# Patient Record
Sex: MALE | Race: WHITE | NOT HISPANIC OR LATINO | Employment: OTHER | URBAN - METROPOLITAN AREA
[De-identification: names, ages, dates, MRNs, and addresses within clinical notes are randomized per-mention and may not be internally consistent; named-entity substitution may affect disease eponyms.]

---

## 2017-01-27 DIAGNOSIS — E11.65 UNCONTROLLED TYPE 2 DIABETES MELLITUS WITH COMPLICATION, UNSPECIFIED LONG TERM INSULIN USE STATUS: ICD-10-CM

## 2017-01-27 DIAGNOSIS — E11.8 UNCONTROLLED TYPE 2 DIABETES MELLITUS WITH COMPLICATION, UNSPECIFIED LONG TERM INSULIN USE STATUS: ICD-10-CM

## 2017-05-23 NOTE — TELEPHONE ENCOUNTER
Was the patient seen in the last year in this department? No     Does patient have an active prescription for medications requested? No     Received Request Via: Pharmacy

## 2017-05-23 NOTE — TELEPHONE ENCOUNTER
Please contact pt and find out if he has completed labs to recheck diabetes, no results available. Overdue for check and needs appt

## 2017-05-23 NOTE — TELEPHONE ENCOUNTER
Was the patient seen in the last year in this department? No    Does patient have an active prescription for medications requested? No     Received Request Via: Pharmacy     Last seen: 03/10/2016 Bernabe

## 2017-06-02 ENCOUNTER — HOSPITAL ENCOUNTER (OUTPATIENT)
Dept: LAB | Facility: MEDICAL CENTER | Age: 52
End: 2017-06-02
Attending: NURSE PRACTITIONER
Payer: COMMERCIAL

## 2017-06-02 DIAGNOSIS — E11.65 UNCONTROLLED TYPE 2 DIABETES MELLITUS WITH COMPLICATION, UNSPECIFIED LONG TERM INSULIN USE STATUS: ICD-10-CM

## 2017-06-02 DIAGNOSIS — E11.8 UNCONTROLLED TYPE 2 DIABETES MELLITUS WITH COMPLICATION, UNSPECIFIED LONG TERM INSULIN USE STATUS: ICD-10-CM

## 2017-06-02 LAB
ALBUMIN SERPL BCP-MCNC: 4.1 G/DL (ref 3.2–4.9)
ALBUMIN/GLOB SERPL: 1.5 G/DL
ALP SERPL-CCNC: 82 U/L (ref 30–99)
ALT SERPL-CCNC: 30 U/L (ref 2–50)
ANION GAP SERPL CALC-SCNC: 8 MMOL/L (ref 0–11.9)
AST SERPL-CCNC: 19 U/L (ref 12–45)
BILIRUB SERPL-MCNC: 1.3 MG/DL (ref 0.1–1.5)
BUN SERPL-MCNC: 19 MG/DL (ref 8–22)
CALCIUM SERPL-MCNC: 9.7 MG/DL (ref 8.5–10.5)
CHLORIDE SERPL-SCNC: 106 MMOL/L (ref 96–112)
CHOLEST SERPL-MCNC: 93 MG/DL (ref 100–199)
CO2 SERPL-SCNC: 23 MMOL/L (ref 20–33)
CREAT SERPL-MCNC: 1.25 MG/DL (ref 0.5–1.4)
EST. AVERAGE GLUCOSE BLD GHB EST-MCNC: 174 MG/DL
GFR SERPL CREATININE-BSD FRML MDRD: >60 ML/MIN/1.73 M 2
GLOBULIN SER CALC-MCNC: 2.8 G/DL (ref 1.9–3.5)
GLUCOSE SERPL-MCNC: 158 MG/DL (ref 65–99)
HBA1C MFR BLD: 7.7 % (ref 0–5.6)
HDLC SERPL-MCNC: 32 MG/DL
LDLC SERPL CALC-MCNC: 32 MG/DL
POTASSIUM SERPL-SCNC: 4 MMOL/L (ref 3.6–5.5)
PROT SERPL-MCNC: 6.9 G/DL (ref 6–8.2)
SODIUM SERPL-SCNC: 137 MMOL/L (ref 135–145)
TRIGL SERPL-MCNC: 144 MG/DL (ref 0–149)

## 2017-06-02 PROCEDURE — 80061 LIPID PANEL: CPT

## 2017-06-02 PROCEDURE — 80053 COMPREHEN METABOLIC PANEL: CPT

## 2017-06-02 PROCEDURE — 36415 COLL VENOUS BLD VENIPUNCTURE: CPT

## 2017-06-02 PROCEDURE — 83036 HEMOGLOBIN GLYCOSYLATED A1C: CPT

## 2017-06-02 NOTE — TELEPHONE ENCOUNTER
1. Caller Name: Hansel Nba Hendrickson                      Call Back Number: [phone] (home) [phone] (work)  2. Message: left a message to schedule an appointment  And if he ever got his labs done     3. Patient approves office to leave a detailed voicemail/MyChart message: yes

## 2017-06-07 ENCOUNTER — OFFICE VISIT (OUTPATIENT)
Dept: MEDICAL GROUP | Facility: MEDICAL CENTER | Age: 52
End: 2017-06-07
Payer: COMMERCIAL

## 2017-06-07 VITALS
HEART RATE: 82 BPM | WEIGHT: 315 LBS | OXYGEN SATURATION: 96 % | TEMPERATURE: 97.6 F | DIASTOLIC BLOOD PRESSURE: 70 MMHG | HEIGHT: 78 IN | BODY MASS INDEX: 36.45 KG/M2 | SYSTOLIC BLOOD PRESSURE: 124 MMHG

## 2017-06-07 DIAGNOSIS — Z12.5 SCREENING FOR PROSTATE CANCER: ICD-10-CM

## 2017-06-07 DIAGNOSIS — E11.65 UNCONTROLLED TYPE 2 DIABETES MELLITUS WITH COMPLICATION, UNSPECIFIED LONG TERM INSULIN USE STATUS: ICD-10-CM

## 2017-06-07 DIAGNOSIS — E11.8 UNCONTROLLED TYPE 2 DIABETES MELLITUS WITH COMPLICATION, UNSPECIFIED LONG TERM INSULIN USE STATUS: ICD-10-CM

## 2017-06-07 DIAGNOSIS — I10 ESSENTIAL HYPERTENSION: ICD-10-CM

## 2017-06-07 PROCEDURE — 99214 OFFICE O/P EST MOD 30 MIN: CPT | Performed by: NURSE PRACTITIONER

## 2017-06-07 RX ORDER — ATORVASTATIN CALCIUM 40 MG/1
40 TABLET, FILM COATED ORAL DAILY
Qty: 30 TAB | Refills: 5 | OUTPATIENT
Start: 2017-06-07

## 2017-06-07 RX ORDER — GLIPIZIDE 10 MG/1
10 TABLET ORAL 2 TIMES DAILY
Qty: 60 TAB | Refills: 5 | Status: SHIPPED | OUTPATIENT
Start: 2017-06-07 | End: 2017-11-04 | Stop reason: SDUPTHER

## 2017-06-07 RX ORDER — LISINOPRIL 10 MG/1
10 TABLET ORAL
Qty: 30 TAB | Refills: 5 | Status: SHIPPED | OUTPATIENT
Start: 2017-06-07 | End: 2017-11-24 | Stop reason: SDUPTHER

## 2017-06-07 RX ORDER — ATORVASTATIN CALCIUM 40 MG/1
40 TABLET, FILM COATED ORAL DAILY
Qty: 30 TAB | Refills: 5 | Status: SHIPPED | OUTPATIENT
Start: 2017-06-07 | End: 2017-11-23 | Stop reason: SDUPTHER

## 2017-06-07 ASSESSMENT — PATIENT HEALTH QUESTIONNAIRE - PHQ9: CLINICAL INTERPRETATION OF PHQ2 SCORE: 0

## 2017-06-07 NOTE — MR AVS SNAPSHOT
"        Hansel Burgos   2017 1:40 PM   Office Visit   MRN: [id number]    Department:  [facility]   Dept Phone:  [phone]    Description:  Male : 1965   Provider:  VIRGINIA Ortiz           Reason for Visit     Follow-Up           Allergies as of 2017     Allergen Noted Reactions    Pcn [Penicillins] 2015       Had as a child      You were diagnosed with     Uncontrolled type 2 diabetes mellitus with complication, unspecified long term insulin use status (CMS-HCC)   [1746705]       Essential hypertension   [1355438]       Screening for prostate cancer   [762854]         Vital Signs     Blood Pressure Pulse Temperature Height Weight Body Mass Index    124/70 mmHg 82 36.4 °C (97.6 °F) 2.057 m (6' 9\") 153.316 kg (338 lb) 36.23 kg/m2    Oxygen Saturation Smoking Status                96% Never Smoker           Basic Information     Date Of Birth Sex Race Ethnicity Preferred Language    1965 Male White Non- English      Problem List              ICD-10-CM Priority Class Noted - Resolved    Diabetes mellitus type 2, uncontrolled, with complications (CMS-HCC) E11.8, E11.65   2015 - Present    Essential hypertension I10   2015 - Present    Diabetic retinopathy (CMS-HCC) E11.319   2015 - Present    BMI 35.0-35.9,adult Z68.35   2015 - Present    History of colonoscopy with polypectomy Z98.890, Z86.010   3/10/2016 - Present    Hyperplastic colon polyp K63.5   3/10/2016 - Present      Health Maintenance        Date Due Completion Dates    IMM HEP B VACCINE (1 of 3 - Primary Series) 1965 ---    DIABETES MONOFILAMENT / LE EXAM 1965 ---    RETINAL SCREENING 3/8/2017 3/8/2016 (Done), 3/4/2014 (Done)    Override on 3/8/2016: Done    Override on 3/4/2014: Done    URINE ACR / MICROALBUMIN 3/8/2017 3/8/2016    A1C SCREENING 2017, 2016, 3/8/2016, 10/15/2015    FASTING LIPID PROFILE 2018, 2016, 2016, " 10/15/2015    SERUM CREATININE 6/2/2018 6/2/2017, 9/29/2016, 9/22/2016, 10/15/2015    IMM DTaP/Tdap/Td Vaccine (2 - Td) 12/7/2018 12/7/2008    COLONOSCOPY 1/21/2021 1/21/2016            Current Immunizations     13-VALENT PCV PREVNAR 12/7/2015    Influenza Vaccine Quad Inj (Pf) 10/15/2015  8:58 AM    Pneumococcal polysaccharide vaccine (PPSV-23) 12/7/2005    Tdap Vaccine 12/7/2008, 12/7/2008      Below and/or attached are the medications your provider expects you to take. Review all of your home medications and newly ordered medications with your provider and/or pharmacist. Follow medication instructions as directed by your provider and/or pharmacist. Please keep your medication list with you and share with your provider. Update the information when medications are discontinued, doses are changed, or new medications (including over-the-counter products) are added; and carry medication information at all times in the event of emergency situations     Allergies:  PCN - (reactions not documented)               Medications  Valid as of: June 07, 2017 -  3:27 PM    Generic Name Brand Name Tablet Size Instructions for use    Atorvastatin Calcium (Tab) LIPITOR 40 MG Take 1 Tab by mouth every day.        GlipiZIDE (Tab) GLUCOTROL 10 MG Take 1 Tab by mouth 2 times a day.        Glucose Blood (Strip) glucose blood  1 Strip by Other route 3 times a day as needed. One touch ultra glucometer and supplies/        Lisinopril (Tab) PRINIVIL 10 MG Take 1 Tab by mouth every day.        MetFORMIN HCl (Tab) GLUCOPHAGE 1000 MG Take 1 Tab by mouth 2 times a day, with meals.        SITagliptin Phosphate (Tab) JANUVIA 100 MG Take 1 Tab by mouth every day.        .                 Medicines prescribed today were sent to:     [id number] - [place], [place] - [address]    [address] [address]    Phone: [phone] Fax: [phone]    Open 24 Hours?: No      Medication refill  instructions:       If your prescription bottle indicates you have medication refills left, it is not necessary to call your provider’s office. Please contact your pharmacy and they will refill your medication.    If your prescription bottle indicates you do not have any refills left, you may request refills at any time through one of the following ways: The online [facility]kaRx system (except Urgent Care), by calling your provider’s office, or by asking your pharmacy to contact your provider’s office with a refill request. Medication refills are processed only during regular business hours and may not be available until the next business day. Your provider may request additional information or to have a follow-up visit with you prior to refilling your medication.   *Please Note: Medication refills are assigned a new Rx number when refilled electronically. Your pharmacy may indicate that no refills were authorized even though a new prescription for the same medication is available at the pharmacy. Please request the medicine by name with the pharmacy before contacting your provider for a refill.        Your To Do List     Future Labs/Procedures Complete By Expires    COMP METABOLIC PANEL  As directed 6/8/2018    HEMOGLOBIN A1C  As directed 6/8/2018    LIPID PROFILE  As directed 6/8/2018    MICROALBUMIN CREAT RATIO URINE  As directed 6/8/2018    PROSTATE SPECIFIC AG SCREENING  As directed 6/8/2018    VITAMIN D,25 HYDROXY  As directed 6/8/2018         AkaRx Access Code: Activation code not generated  Current AkaRx Status: Active

## 2017-06-07 NOTE — Clinical Note
[facility]  LEOPOLDO Ortiz.  [address]  [place] [address]  Fax: [phone]   Authorization for Release/Disclosure of   Protected Health Information   Name: KARLA MCKEON : 1965 SSN: [id number]   Address: [address] [place] [address] [address] [place] [address] Phone:    [phone] (home) [phone] (work)   I authorize the entity listed below to release/disclose the PHI below to:   [facility]/VIRGINIA Ortiz and VIRGINIA Ortiz   Provider or Entity Name:  [facility]   Address   City, State, [facility]   Phone:      Fax:     Reason for request: continuity of care   Information to be released:    [  ] LAST COLONOSCOPY,  including any PATH REPORT and follow-up  [  ] LAST FIT/COLOGUARD RESULT [  ] LAST DEXA  [  ] LAST MAMMOGRAM  [  ] LAST PAP  [  ] LAST LABS [  ] RETINA EXAM REPORT  [  ] IMMUNIZATION RECORDS  [  ] Release all info      [  ] Check here and initial the line next to each item to release ALL health information INCLUDING  _____ Care and treatment for drug and / or alcohol abuse  _____ HIV testing, infection status, or AIDS  _____ Genetic Testing    DATES OF SERVICE OR TIME PERIOD TO BE DISCLOSED: _____________  I understand and acknowledge that:  * This Authorization may be revoked at any time by you in writing, except if your health information has already been used or disclosed.  * Your health information that will be used or disclosed as a result of you signing this authorization could be re-disclosed by the recipient. If this occurs, your re-disclosed health information may no longer be protected by State or Federal laws.  * You may refuse to sign this Authorization. Your refusal will not affect your ability to obtain treatment.  * This Authorization becomes effective upon signing and will  on (date) __________.      If no date is indicated, this Authorization will  one (1) year from the signature date.    Name: Karla Arango  Loretta    Signature:   Date:     6/7/2017       PLEASE FAX REQUESTED RECORDS BACK TO: [phone]

## 2017-06-07 NOTE — PROGRESS NOTES
"Subjective:     Chief Complaint   Patient presents with   • Follow-Up     Hansel Burgos is a 52 y.o. male here today to follow up on:    Diabetes mellitus type 2, uncontrolled, with complications  Current a1c 7.7  Testing occ at home, not consistently  Has 2 episodes of lows recently, to the 60s. Felt shaky, sweaty. Symptoms resolved after eating  He has been inconsistent with diet, Skipping meals at times  Continues glipizide 10 mg twice daily, metformin 1000 mg twice daily, januvia 100 mg   Some peripheral neuropathy and retinopathy  Up-to-date on eye exam although we have not gotten recent record  Chronic kidney disease  Denies polyuria, polydipsia  On atorvastatin, lisinopril    Essential hypertension  BP at goal with lisinopril 10 mg daily  Some dizziness with change of position at times       Current medicines (including changes today)  Current Outpatient Prescriptions   Medication Sig Dispense Refill   • sitagliptin (JANUVIA) 100 MG Tab Take 1 Tab by mouth every day. 30 Tab 5   • glipiZIDE (GLUCOTROL) 10 MG Tab Take 1 Tab by mouth 2 times a day. 60 Tab 5   • lisinopril (PRINIVIL) 10 MG Tab Take 1 Tab by mouth every day. 30 Tab 5   • metformin (GLUCOPHAGE) 1000 MG tablet Take 1 Tab by mouth 2 times a day, with meals. 60 Tab 5   • atorvastatin (LIPITOR) 40 MG Tab Take 1 Tab by mouth every day. 30 Tab 5   • glucose blood strip 1 Each by Other route as needed. One touch ultra glucometer and supplies/       No current facility-administered medications for this visit.     He  has a past medical history of Diabetes (CMS-[facility]); Hypertension; Hyperlipidemia; Arthritis; Pain; and High cholesterol.    ROS included above     Objective:     Blood pressure 124/70, pulse 82, temperature 36.4 °C (97.6 °F), height 2.057 m (6' 9\"), weight 153.316 kg (338 lb), SpO2 96 %. Body mass index is 36.23 kg/(m^2).     Physical Exam:  General: Alert, oriented in no acute distress.  Eye contact is good, speech is normal, " affect calm  Lungs: clear to auscultation bilaterally, normal effort, no wheeze/ rhonchi/ rales.  CV: regular rate and rhythm, S1, S2, no murmur  Abdomen: soft, nontender  Ext: no edema, color normal, vascularity normal, temperature normal    Assessment and Plan:   The following treatment plan was discussed   1. Uncontrolled type 2 diabetes mellitus with complication, unspecified long term insulin use status (CMS-[facility])   recent labs reviewed. A1c improved but not at goal. Encouraged more consistency with diet, food choices, home monitoring of glucose. Repeat labs in 6 months. Record request for eye exam sent sitagliptin (JANUVIA) 100 MG Tab    metformin (GLUCOPHAGE) 1000 MG tablet    atorvastatin (LIPITOR) 40 MG Tab    COMP METABOLIC PANEL    HEMOGLOBIN A1C    MICROALBUMIN CREAT RATIO URINE    LIPID PROFILE    VITAMIN D,25 HYDROXY   2. Essential hypertension   stable  lisinopril (PRINIVIL) 10 MG Tab   3. Screening for prostate cancer  PROSTATE SPECIFIC AG SCREENING       Followup: 6 months with labs prior         Please note that this dictation was created using voice recognition software. I have worked with consultants from the vendor as well as technical experts from [employer] to optimize the interface. I have made every reasonable attempt to correct obvious errors, but I expect that there are errors of grammar and possibly content that I did not discover before finalizing the note.

## 2017-06-07 NOTE — ASSESSMENT & PLAN NOTE
Current a1c 7.7  Testing occ at home, not consistently  Has 2 episodes of lows recently, to the 60s. Felt shaky, sweaty. Symptoms resolved after eating  He has been inconsistent with diet, Skipping meals at times  Continues glipizide 10 mg twice daily, metformin 1000 mg twice daily, januvia 100 mg   Some peripheral neuropathy and retinopathy  Up-to-date on eye exam although we have not gotten recent record  Chronic kidney disease  Denies polyuria, polydipsia  On atorvastatin, lisinopril

## 2017-06-20 RX ORDER — ATORVASTATIN CALCIUM 40 MG/1
TABLET, FILM COATED ORAL
Refills: 0 | OUTPATIENT
Start: 2017-06-20

## 2017-06-20 RX ORDER — SITAGLIPTIN 100 MG/1
TABLET, FILM COATED ORAL
Qty: 30 TAB | OUTPATIENT
Start: 2017-06-20

## 2017-10-05 ENCOUNTER — HOSPITAL ENCOUNTER (OUTPATIENT)
Facility: MEDICAL CENTER | Age: 52
End: 2017-10-05
Payer: COMMERCIAL

## 2017-10-05 LAB
ALBUMIN SERPL BCP-MCNC: 4.2 G/DL (ref 3.2–4.9)
ALBUMIN/GLOB SERPL: 1.2 G/DL
ALP SERPL-CCNC: 88 U/L (ref 30–99)
ALT SERPL-CCNC: 21 U/L (ref 2–50)
ANION GAP SERPL CALC-SCNC: 8 MMOL/L (ref 0–11.9)
AST SERPL-CCNC: 15 U/L (ref 12–45)
BDY FAT % MEASURED: ABNORMAL %
BILIRUB SERPL-MCNC: 1.2 MG/DL (ref 0.1–1.5)
BP DIAS: 88 MMHG
BP SYS: 154 MMHG
BUN SERPL-MCNC: 13 MG/DL (ref 8–22)
CALCIUM SERPL-MCNC: 9.1 MG/DL (ref 8.5–10.5)
CHLORIDE SERPL-SCNC: 107 MMOL/L (ref 96–112)
CHOLEST SERPL-MCNC: 106 MG/DL (ref 100–199)
CO2 SERPL-SCNC: 24 MMOL/L (ref 20–33)
CREAT SERPL-MCNC: 1.03 MG/DL (ref 0.5–1.4)
DIABETES HTDIA: YES
EST. AVERAGE GLUCOSE BLD GHB EST-MCNC: 209 MG/DL
EVENT NAME HTEVT: NORMAL
GFR SERPL CREATININE-BSD FRML MDRD: >60 ML/MIN/1.73 M 2
GLOBULIN SER CALC-MCNC: 3.4 G/DL (ref 1.9–3.5)
GLUCOSE SERPL-MCNC: 220 MG/DL (ref 65–99)
HBA1C MFR BLD: 8.9 % (ref 0–5.6)
HDLC SERPL-MCNC: 36 MG/DL
HYPERTENSION HTHYP: YES
LDLC SERPL CALC-MCNC: 51 MG/DL
POTASSIUM SERPL-SCNC: 4.3 MMOL/L (ref 3.6–5.5)
PROT SERPL-MCNC: 7.6 G/DL (ref 6–8.2)
SCREENING LOC CITY HTCIT: NORMAL
SCREENING LOC STATE HTSTA: NORMAL
SCREENING LOCATION HTLOC: NORMAL
SODIUM SERPL-SCNC: 139 MMOL/L (ref 135–145)
SUBSCRIBER ID HTSID: NORMAL
TRIGL SERPL-MCNC: 97 MG/DL (ref 0–149)

## 2017-10-06 ENCOUNTER — TELEPHONE (OUTPATIENT)
Dept: MEDICAL GROUP | Facility: MEDICAL CENTER | Age: 52
End: 2017-10-06

## 2017-10-06 NOTE — TELEPHONE ENCOUNTER
----- Message from VIRGINIA Ortiz sent at 10/6/2017  7:45 AM PDT -----  Please see below, call pt to schedule DCV  Gee,  Your a1c has increased quite a bit. I would like you to come in for a diabetes focused visit, to meet with a diabetes specialized nurse and myself. I have asked the office to contact you to schedule.  Alejandra CONNOR

## 2017-10-09 ENCOUNTER — TELEPHONE (OUTPATIENT)
Dept: MEDICAL GROUP | Facility: MEDICAL CENTER | Age: 52
End: 2017-10-09

## 2017-10-09 NOTE — TELEPHONE ENCOUNTER
----- Message from Tory Hdz sent at 10/9/2017  9:49 AM PDT -----  Left message for patient  ----- Message -----  From: Lucila Herrera, Med Ass't  Sent: 10/6/2017  10:02 AM  To: Tory Hdz    Could you please contact this patient to schedule him for a diabetes visit? Thank you!

## 2017-11-06 RX ORDER — GLIPIZIDE 10 MG/1
TABLET ORAL
Qty: 60 TAB | Refills: 5 | Status: SHIPPED | OUTPATIENT
Start: 2017-11-06 | End: 2018-04-26 | Stop reason: SDUPTHER

## 2017-11-23 DIAGNOSIS — E11.65 UNCONTROLLED TYPE 2 DIABETES MELLITUS WITH COMPLICATION, UNSPECIFIED LONG TERM INSULIN USE STATUS: ICD-10-CM

## 2017-11-23 DIAGNOSIS — E11.8 UNCONTROLLED TYPE 2 DIABETES MELLITUS WITH COMPLICATION, UNSPECIFIED LONG TERM INSULIN USE STATUS: ICD-10-CM

## 2017-11-24 DIAGNOSIS — I10 ESSENTIAL HYPERTENSION: ICD-10-CM

## 2017-11-27 RX ORDER — LISINOPRIL 10 MG/1
TABLET ORAL
Qty: 30 TAB | Refills: 5 | Status: SHIPPED | OUTPATIENT
Start: 2017-11-27 | End: 2018-05-16 | Stop reason: SDUPTHER

## 2017-11-27 RX ORDER — ATORVASTATIN CALCIUM 40 MG/1
TABLET, FILM COATED ORAL
Qty: 30 TAB | Refills: 5 | Status: SHIPPED | OUTPATIENT
Start: 2017-11-27 | End: 2018-05-16 | Stop reason: SDUPTHER

## 2017-11-27 RX ORDER — LISINOPRIL 10 MG/1
TABLET ORAL
Refills: 0 | OUTPATIENT
Start: 2017-11-27

## 2018-01-09 ENCOUNTER — HOSPITAL ENCOUNTER (OUTPATIENT)
Dept: LAB | Facility: MEDICAL CENTER | Age: 53
End: 2018-01-09
Attending: NURSE PRACTITIONER
Payer: COMMERCIAL

## 2018-01-09 DIAGNOSIS — E11.8 UNCONTROLLED TYPE 2 DIABETES MELLITUS WITH COMPLICATION, UNSPECIFIED LONG TERM INSULIN USE STATUS: ICD-10-CM

## 2018-01-09 DIAGNOSIS — E11.65 UNCONTROLLED TYPE 2 DIABETES MELLITUS WITH COMPLICATION, UNSPECIFIED LONG TERM INSULIN USE STATUS: ICD-10-CM

## 2018-01-09 DIAGNOSIS — Z12.5 SCREENING FOR PROSTATE CANCER: ICD-10-CM

## 2018-01-09 LAB
25(OH)D3 SERPL-MCNC: 25 NG/ML (ref 30–100)
PSA SERPL-MCNC: 1.12 NG/ML (ref 0–4)

## 2018-01-09 PROCEDURE — 82570 ASSAY OF URINE CREATININE: CPT

## 2018-01-09 PROCEDURE — 83036 HEMOGLOBIN GLYCOSYLATED A1C: CPT

## 2018-01-09 PROCEDURE — 84153 ASSAY OF PSA TOTAL: CPT

## 2018-01-09 PROCEDURE — 80061 LIPID PANEL: CPT

## 2018-01-09 PROCEDURE — 82043 UR ALBUMIN QUANTITATIVE: CPT

## 2018-01-09 PROCEDURE — 80053 COMPREHEN METABOLIC PANEL: CPT

## 2018-01-09 PROCEDURE — 36415 COLL VENOUS BLD VENIPUNCTURE: CPT

## 2018-01-09 PROCEDURE — 82306 VITAMIN D 25 HYDROXY: CPT

## 2018-01-10 DIAGNOSIS — E11.8 UNCONTROLLED TYPE 2 DIABETES MELLITUS WITH COMPLICATION, UNSPECIFIED LONG TERM INSULIN USE STATUS: ICD-10-CM

## 2018-01-10 DIAGNOSIS — E11.65 UNCONTROLLED TYPE 2 DIABETES MELLITUS WITH COMPLICATION, UNSPECIFIED LONG TERM INSULIN USE STATUS: ICD-10-CM

## 2018-01-10 LAB
ALBUMIN SERPL BCP-MCNC: 3.9 G/DL (ref 3.2–4.9)
ALBUMIN/GLOB SERPL: 1.3 G/DL
ALP SERPL-CCNC: 75 U/L (ref 30–99)
ALT SERPL-CCNC: 37 U/L (ref 2–50)
ANION GAP SERPL CALC-SCNC: 6 MMOL/L (ref 0–11.9)
AST SERPL-CCNC: 25 U/L (ref 12–45)
BILIRUB SERPL-MCNC: 1.2 MG/DL (ref 0.1–1.5)
BUN SERPL-MCNC: 15 MG/DL (ref 8–22)
CALCIUM SERPL-MCNC: 9.4 MG/DL (ref 8.5–10.5)
CHLORIDE SERPL-SCNC: 110 MMOL/L (ref 96–112)
CHOLEST SERPL-MCNC: 88 MG/DL (ref 100–199)
CO2 SERPL-SCNC: 21 MMOL/L (ref 20–33)
CREAT SERPL-MCNC: 1.08 MG/DL (ref 0.5–1.4)
CREAT UR-MCNC: 182.9 MG/DL
GLOBULIN SER CALC-MCNC: 3.1 G/DL (ref 1.9–3.5)
GLUCOSE SERPL-MCNC: 153 MG/DL (ref 65–99)
HDLC SERPL-MCNC: 29 MG/DL
LDLC SERPL CALC-MCNC: 43 MG/DL
MICROALBUMIN UR-MCNC: 1.8 MG/DL
MICROALBUMIN/CREAT UR: 10 MG/G (ref 0–30)
POTASSIUM SERPL-SCNC: 4.2 MMOL/L (ref 3.6–5.5)
PROT SERPL-MCNC: 7 G/DL (ref 6–8.2)
SODIUM SERPL-SCNC: 137 MMOL/L (ref 135–145)
TRIGL SERPL-MCNC: 82 MG/DL (ref 0–149)

## 2018-01-11 LAB
EST. AVERAGE GLUCOSE BLD GHB EST-MCNC: 180 MG/DL
HBA1C MFR BLD: 7.9 % (ref 0–5.6)

## 2018-01-16 ENCOUNTER — OFFICE VISIT (OUTPATIENT)
Dept: MEDICAL GROUP | Facility: MEDICAL CENTER | Age: 53
End: 2018-01-16
Payer: COMMERCIAL

## 2018-01-16 VITALS
SYSTOLIC BLOOD PRESSURE: 118 MMHG | HEART RATE: 77 BPM | OXYGEN SATURATION: 96 % | TEMPERATURE: 96.9 F | DIASTOLIC BLOOD PRESSURE: 76 MMHG

## 2018-01-16 DIAGNOSIS — E11.319 DIABETIC RETINOPATHY OF BOTH EYES WITHOUT MACULAR EDEMA ASSOCIATED WITH TYPE 2 DIABETES MELLITUS, UNSPECIFIED RETINOPATHY SEVERITY (HCC): ICD-10-CM

## 2018-01-16 DIAGNOSIS — Z82.69 FAMILY HISTORY OF CONNECTIVE TISSUE DISEASE: ICD-10-CM

## 2018-01-16 DIAGNOSIS — E11.621 DIABETIC ULCER OF TOE OF LEFT FOOT ASSOCIATED WITH TYPE 2 DIABETES MELLITUS, LIMITED TO BREAKDOWN OF SKIN (HCC): ICD-10-CM

## 2018-01-16 DIAGNOSIS — Z82.49 FAMILY HISTORY OF AORTIC ANEURYSM: ICD-10-CM

## 2018-01-16 DIAGNOSIS — L97.521 DIABETIC ULCER OF TOE OF LEFT FOOT ASSOCIATED WITH TYPE 2 DIABETES MELLITUS, LIMITED TO BREAKDOWN OF SKIN (HCC): ICD-10-CM

## 2018-01-16 PROCEDURE — 99214 OFFICE O/P EST MOD 30 MIN: CPT | Performed by: NURSE PRACTITIONER

## 2018-01-16 NOTE — LETTER
Request for Medical Records    Patient Name: Hansel Burgos    : 1965      Dear Doctor: ISIS LEWIS     The above named patient receives primary care at the [facility] by VIRGINIA Ortiz.  The patient informs us that you are his eye care Provider.    Please fax a copy of the most recent eye exam to [phone] or answer the  questions below and fax this sheet back to us at the above number.  Attached is a signed Release of Information.      Date of last eye exam: _____________    Retinal eye exam summary:        Please select the choice(s) that apply.    ____ No diabetic retinopathy    ____    Diabetic retinopathy present      Printed Name and Credentials: __________________________________    Signature of Eye Care Provider: _________________________________    We appreciate your assistance and collaboration in providing efficient patient care!    Kindest Regards,    [facility]  [address]  [place] [address] [phone]

## 2018-01-16 NOTE — PROGRESS NOTES
RN-OSBALDO Note    Subjective:     Health changes since last visit/interval Hx: patient had a podiatrist appointment yesterday- left toe ulcer covered with wrap and healing and open crack on bottom of right foot healing.  He has a follow up appointment with podiatry in 3 weeks.  He has been working hard on diet since [place]    Medications (including changes made today)  Current Outpatient Prescriptions   Medication Sig Dispense Refill   • metformin (GLUCOPHAGE) 1000 MG tablet TAKE 1 TABLET BY MOUTH TWICE DAILY WITH MEALS 60 Tab 5   • JANUVIA 100 MG Tab TAKE 1 TABLET BY MOUTH EVERY DAY 30 Tab 2   • atorvastatin (LIPITOR) 40 MG Tab TAKE 1 TABLET BY MOUTH EVERY DAY 30 Tab 5   • ONE TOUCH ULTRA TEST strip USE ONE STRIP FOR BLOOD SUGAR TEST THREE TIMES DAILY 100 Strip 2   • lisinopril (PRINIVIL) 10 MG Tab TAKE 1 TABLET BY MOUTH EVERY DAY 30 Tab 5   • glipiZIDE (GLUCOTROL) 10 MG Tab TAKE 1 TABLET BY MOUTH TWICE DAILY 60 Tab 5     No current facility-administered medications for this visit.        Taking daily ASA: No  Taking above medications as prescribed: Yes  Patient Denies side effects of medication.    Exercise: no regular exercise, sedentary  Diet: meals per day on average: 2    Health Maintenance:   Health Maintenance Topics with due status: Overdue       Topic Date Due    IMM HEP B VACCINE 1965    DIABETES MONOFILAMENT / LE EXAM 1965    RETINAL SCREENING 03/08/2017    IMM INFLUENZA 09/01/2017         DM:   Last A1c:   Lab Results   Component Value Date/Time    HBA1C 7.9 (H) 01/09/2018 11:16 AM      A1c goal: < 7    Glucose monitoring frequency: fasting  fasting range: 180's  patient forgot to bring HBG readings  Hypoglycemic episodes: yes - occasional nocturnal     Last Retinal Exam: within past year Provider: Raquel  Daily Foot Exam: yes  Routine Dental Exams: yes    Lab Results   Component Value Date/Time    MALBCRT 10 01/09/2018 11:15 AM    MICROALBUR 1.8 01/09/2018 11:15 AM        ACR  Albumin/Creatinine Ratio goal <30 yes    Diabetic complications: retinopathy    HTN:   Blood pressure goal <140/<80 yes.   Currently Rx ACE/ARB: Yes    Dyslipidemia:    Lab Results   Component Value Date/Time    CHOLSTRLTOT 88 (L) 01/09/2018 11:16 AM    LDL 43 01/09/2018 11:16 AM    HDL 29 (A) 01/09/2018 11:16 AM    TRIGLYCERIDE 82 01/09/2018 11:16 AM       Lab Results   Component Value Date/Time    SODIUM 137 01/09/2018 11:16 AM    POTASSIUM 4.2 01/09/2018 11:16 AM    CHLORIDE 110 01/09/2018 11:16 AM    CO2 21 01/09/2018 11:16 AM    GLUCOSE 153 (H) 01/09/2018 11:16 AM    BUN 15 01/09/2018 11:16 AM    CREATININE 1.08 01/09/2018 11:16 AM     Lab Results   Component Value Date/Time    ALKPHOSPHAT 75 01/09/2018 11:16 AM    ASTSGOT 25 01/09/2018 11:16 AM    ALTSGPT 37 01/09/2018 11:16 AM    TBILIRUBIN 1.2 01/09/2018 11:16 AM        Currently Rx Statin: Yes      He  reports that he has never smoked. He has never used smokeless tobacco.    Objective:     Exam:  Monofilament: done  Monofilament testing with a 10 gram force: sensation intact: decreased bilaterally  Visual Inspection: Feet with maceration, ulcers, fissures.  Pedal pulses: decreased bilaterally      Plan:     Discussed All medications, side effects and compliance (discussed carefully)  Annual eye examinations at Ophthalmology  Diabetic diet discussed in detail, written exchange diet given  Foot care discussed and Podiatry visits  Glycohemoglobin and other lab monitoring  Home glucose monitoring emphasized. Patient will send FSBG results in 2-6 weeks  Patient educated on Interpretation of lab results, Nutrition, use of Bydureon pen    Recommended medication changes: change Januvia to Bydureon.  Patient will send FSBG in 4-6 weeks or sooner if hypoglycemia to wean off of glipizide    Subjective:     Chief Complaint   Patient presents with   • Diabetes     Hansel Burgos is a 52 y.o. male here today to follow up on diabetes. Note from diabetes RN  reviewed. We discussed:    Diabetic ulcer of toe of left foot associated with type 2 diabetes mellitus, limited to breakdown of skin (CMS-HCC)  Followed by podiatrist, last seen Monday  Overall improving  Decreased sensation in feet bilaterally  No drainage, odor, swlling    Diabetes mellitus type 2, uncontrolled, with complications  Component      Latest Ref Rng & Units 10/5/2017 1/9/2018           8:38 AM 11:16 AM   Glycohemoglobin      0.0 - 5.6 % 8.9 (H) 7.9 (H)   Estim. Avg Glu      mg/dL 209 180   Above notes reviewed    Diabetic retinopathy  Followed by Nahid, no change from last exam. No record on file    Family history of aortic aneurysm  Multiple maternal family members for aortic aneurysm, requests screening  No h/o tobacco use. No abd pain  He is treated for hypertension       Current medicines (including changes today)  Current Outpatient Prescriptions   Medication Sig Dispense Refill   • Exenatide ER (BYDUREON) 2 MG Pen-injector Inject 2 mg as instructed every 7 days. 4 Each 11   • metformin (GLUCOPHAGE) 1000 MG tablet TAKE 1 TABLET BY MOUTH TWICE DAILY WITH MEALS 60 Tab 5   • JANUVIA 100 MG Tab TAKE 1 TABLET BY MOUTH EVERY DAY 30 Tab 2   • atorvastatin (LIPITOR) 40 MG Tab TAKE 1 TABLET BY MOUTH EVERY DAY 30 Tab 5   • ONE TOUCH ULTRA TEST strip USE ONE STRIP FOR BLOOD SUGAR TEST THREE TIMES DAILY 100 Strip 2   • lisinopril (PRINIVIL) 10 MG Tab TAKE 1 TABLET BY MOUTH EVERY DAY 30 Tab 5   • glipiZIDE (GLUCOTROL) 10 MG Tab TAKE 1 TABLET BY MOUTH TWICE DAILY 60 Tab 5     No current facility-administered medications for this visit.      He  has a past medical history of Arthritis; Diabetes (CMS-HCC); High cholesterol; Hyperlipidemia; Hypertension; and Pain.    ROS included above     Objective:     Blood pressure 118/76, pulse 77, temperature 36.1 °C (96.9 °F), SpO2 96 %. There is no height or weight on file to calculate BMI.     Physical Exam:  General: Alert, oriented in no acute distress.  Eye contact  is good, speech is normal, affect calm  Lungs: clear to auscultation bilaterally, normal effort, no wheeze/ rhonchi/ rales.  CV: regular rate and rhythm, S1, S2, no murmur  Abdomen: soft, nontender, no pulsatile mass  Ext: no edema, color normal, vascularity normal, temperature normal    Assessment and Plan:   The following treatment plan was discussed   1. Uncontrolled type 2 diabetes mellitus with complication, without long-term current use of insulin (CMS-HCC)  Recommended medications changes reviewed. Will discontinue januvia, switch to bydureon. Continue other medications. Continue diet efforts, enc increased exercise. Labs in May  Diabetic Monofilament LE Exam    Exenatide ER (BYDUREON) 2 MG Pen-injector    COMP METABOLIC PANEL    HEMOGLOBIN A1C   2. Diabetic ulcer of toe of left foot associated with type 2 diabetes mellitus, limited to breakdown of skin (CMS-HCC)  Continue f/u with podiatrist   3. Diabetic retinopathy of both eyes without macular edema associated with type 2 diabetes mellitus, unspecified retinopathy severity (CMS-HCC)  Record requested   4. Family history of aortic aneurysm  Several family members with aortic aneurysm. Will obtain screening ultrasound  US-AORTA   5. Family history of connective tissue disease  US-AORTA       Followup: labs in May         Please note that this dictation was created using voice recognition software. I have worked with consultants from the vendor as well as technical experts from [employer] to optimize the interface. I have made every reasonable attempt to correct obvious errors, but I expect that there are errors of grammar and possibly content that I did not discover before finalizing the note.

## 2018-01-16 NOTE — LETTER
Replaced by [facility]  LEOPOLDO Ortiz.  [address]  [place] [address]  Fax: [phone]   Authorization for Release/Disclosure of   Protected Health Information   Name: KARLA MCKEON : 1965 SSN: [id number]   Address: [address] [place] [address] [address] [place] [address] Phone:    [phone] (home) [phone] (work)   I authorize the entity listed below to release/disclose the PHI below to:   Replaced by [facility]/VIRGINIA Ortiz and VIRGINIA Ortiz   Provider or Entity Name:  [facility]   Address   [facility], [facility], [facility]   Phone:  [phone]    Fax:  [phone]   Reason for request: continuity of care   Information to be released:    [  ] LAST COLONOSCOPY,  including any PATH REPORT and follow-up  [  ] LAST FIT/COLOGUARD RESULT [  ] LAST DEXA  [  ] LAST MAMMOGRAM  [  ] LAST PAP  [  ] LAST LABS [ x] RETINA EXAM REPORT  [  ] IMMUNIZATION RECORDS  [  ] Release all info      [  ] Check here and initial the line next to each item to release ALL health information INCLUDING  _____ Care and treatment for drug and / or alcohol abuse  _____ HIV testing, infection status, or AIDS  _____ Genetic Testing    DATES OF SERVICE OR TIME PERIOD TO BE DISCLOSED: _____________  I understand and acknowledge that:  * This Authorization may be revoked at any time by you in writing, except if your health information has already been used or disclosed.  * Your health information that will be used or disclosed as a result of you signing this authorization could be re-disclosed by the recipient. If this occurs, your re-disclosed health information may no longer be protected by State or Federal laws.  * You may refuse to sign this Authorization. Your refusal will not affect your ability to obtain treatment.  * This Authorization becomes effective upon signing and will  on (date) __________.      If no date is indicated, this Authorization will  one (1) year from the signature date.    Name:  Hansel Burgos    Signature:   Date:     1/16/2018       PLEASE FAX REQUESTED RECORDS BACK TO: [phone]

## 2018-01-16 NOTE — ASSESSMENT & PLAN NOTE
Followed by podiatrist, last seen Monday  Overall improving  Decreased sensation in feet bilaterally  No drainage, odor, swlling

## 2018-01-16 NOTE — ASSESSMENT & PLAN NOTE
Component      Latest Ref Rng & Units 10/5/2017 1/9/2018           8:38 AM 11:16 AM   Glycohemoglobin      0.0 - 5.6 % 8.9 (H) 7.9 (H)   Estim. Avg Glu      mg/dL 209 180

## 2018-01-17 NOTE — ASSESSMENT & PLAN NOTE
Multiple maternal family members for aortic aneurysm, requests screening  No h/o tobacco use. No abd pain  He is treated for hypertension

## 2018-01-23 ENCOUNTER — HOSPITAL ENCOUNTER (OUTPATIENT)
Dept: RADIOLOGY | Facility: MEDICAL CENTER | Age: 53
End: 2018-01-23
Attending: NURSE PRACTITIONER
Payer: COMMERCIAL

## 2018-01-23 DIAGNOSIS — Z82.49 FAMILY HISTORY OF AORTIC ANEURYSM: ICD-10-CM

## 2018-01-23 DIAGNOSIS — Z82.69 FAMILY HISTORY OF CONNECTIVE TISSUE DISEASE: ICD-10-CM

## 2018-01-23 PROCEDURE — 76775 US EXAM ABDO BACK WALL LIM: CPT

## 2018-04-01 DIAGNOSIS — E11.65 UNCONTROLLED TYPE 2 DIABETES MELLITUS WITH COMPLICATION, UNSPECIFIED LONG TERM INSULIN USE STATUS: ICD-10-CM

## 2018-04-01 DIAGNOSIS — E11.8 UNCONTROLLED TYPE 2 DIABETES MELLITUS WITH COMPLICATION, UNSPECIFIED LONG TERM INSULIN USE STATUS: ICD-10-CM

## 2018-04-02 RX ORDER — SITAGLIPTIN 100 MG/1
TABLET, FILM COATED ORAL
Qty: 30 TAB | Refills: 0 | Status: SHIPPED | OUTPATIENT
Start: 2018-04-02 | End: 2018-04-23

## 2018-04-23 ENCOUNTER — APPOINTMENT (OUTPATIENT)
Dept: ADMISSIONS | Facility: MEDICAL CENTER | Age: 53
End: 2018-04-23
Attending: PODIATRIST
Payer: COMMERCIAL

## 2018-04-23 DIAGNOSIS — Z01.810 PRE-OPERATIVE CARDIOVASCULAR EXAMINATION: ICD-10-CM

## 2018-04-23 LAB — EKG IMPRESSION: NORMAL

## 2018-04-23 PROCEDURE — 93005 ELECTROCARDIOGRAM TRACING: CPT

## 2018-04-23 PROCEDURE — 93010 ELECTROCARDIOGRAM REPORT: CPT | Performed by: INTERNAL MEDICINE

## 2018-04-23 RX ORDER — ASPIRIN 325 MG
325 TABLET ORAL DAILY
COMMUNITY

## 2018-04-26 RX ORDER — GLIPIZIDE 10 MG/1
TABLET ORAL
Qty: 60 TAB | Refills: 11 | Status: SHIPPED | OUTPATIENT
Start: 2018-04-26 | End: 2019-04-15 | Stop reason: SDUPTHER

## 2018-04-30 RX ORDER — SITAGLIPTIN 100 MG/1
TABLET, FILM COATED ORAL
Qty: 90 TAB | Refills: 3 | Status: SHIPPED | OUTPATIENT
Start: 2018-04-30 | End: 2018-09-21

## 2018-05-01 ENCOUNTER — HOSPITAL ENCOUNTER (OUTPATIENT)
Facility: MEDICAL CENTER | Age: 53
End: 2018-05-01
Attending: PODIATRIST | Admitting: PODIATRIST
Payer: COMMERCIAL

## 2018-05-01 VITALS
SYSTOLIC BLOOD PRESSURE: 149 MMHG | BODY MASS INDEX: 36.45 KG/M2 | TEMPERATURE: 98.2 F | HEART RATE: 75 BPM | RESPIRATION RATE: 18 BRPM | HEIGHT: 78 IN | OXYGEN SATURATION: 97 % | WEIGHT: 315 LBS | DIASTOLIC BLOOD PRESSURE: 99 MMHG

## 2018-05-01 LAB — GLUCOSE BLD-MCNC: 128 MG/DL (ref 65–99)

## 2018-05-01 PROCEDURE — 500432 HCHG DRESSING, KLING 3: Performed by: PODIATRIST

## 2018-05-01 PROCEDURE — 500423 HCHG DRESSING, ABD COMBINE: Performed by: PODIATRIST

## 2018-05-01 PROCEDURE — 160022 HCHG BLOCK: Performed by: PODIATRIST

## 2018-05-01 PROCEDURE — 700102 HCHG RX REV CODE 250 W/ 637 OVERRIDE(OP): Performed by: PODIATRIST

## 2018-05-01 PROCEDURE — 700101 HCHG RX REV CODE 250

## 2018-05-01 PROCEDURE — 160002 HCHG RECOVERY MINUTES (STAT): Performed by: PODIATRIST

## 2018-05-01 PROCEDURE — A9270 NON-COVERED ITEM OR SERVICE: HCPCS

## 2018-05-01 PROCEDURE — 160025 RECOVERY II MINUTES (STATS): Performed by: PODIATRIST

## 2018-05-01 PROCEDURE — A6454 SELF-ADHER BAND W>=3" <5"/YD: HCPCS | Performed by: PODIATRIST

## 2018-05-01 PROCEDURE — 160039 HCHG SURGERY MINUTES - EA ADDL 1 MIN LEVEL 3: Performed by: PODIATRIST

## 2018-05-01 PROCEDURE — 160036 HCHG PACU - EA ADDL 30 MINS PHASE I: Performed by: PODIATRIST

## 2018-05-01 PROCEDURE — 500433 HCHG DRESSING, KLING 4': Performed by: PODIATRIST

## 2018-05-01 PROCEDURE — 160028 HCHG SURGERY MINUTES - 1ST 30 MINS LEVEL 3: Performed by: PODIATRIST

## 2018-05-01 PROCEDURE — 501838 HCHG SUTURE GENERAL: Performed by: PODIATRIST

## 2018-05-01 PROCEDURE — 160046 HCHG PACU - 1ST 60 MINS PHASE II: Performed by: PODIATRIST

## 2018-05-01 PROCEDURE — 700102 HCHG RX REV CODE 250 W/ 637 OVERRIDE(OP)

## 2018-05-01 PROCEDURE — 500126 HCHG BOVIE, NEEDLE TIP: Performed by: PODIATRIST

## 2018-05-01 PROCEDURE — 160009 HCHG ANES TIME/MIN: Performed by: PODIATRIST

## 2018-05-01 PROCEDURE — A9270 NON-COVERED ITEM OR SERVICE: HCPCS | Performed by: PODIATRIST

## 2018-05-01 PROCEDURE — 700105 HCHG RX REV CODE 258: Performed by: PODIATRIST

## 2018-05-01 PROCEDURE — 160035 HCHG PACU - 1ST 60 MINS PHASE I: Performed by: PODIATRIST

## 2018-05-01 PROCEDURE — 82962 GLUCOSE BLOOD TEST: CPT

## 2018-05-01 PROCEDURE — 500881 HCHG PACK, EXTREMITY: Performed by: PODIATRIST

## 2018-05-01 PROCEDURE — 160048 HCHG OR STATISTICAL LEVEL 1-5: Performed by: PODIATRIST

## 2018-05-01 PROCEDURE — 700111 HCHG RX REV CODE 636 W/ 250 OVERRIDE (IP)

## 2018-05-01 RX ORDER — ACETAMINOPHEN 500 MG
TABLET ORAL
Status: COMPLETED
Start: 2018-05-01 | End: 2018-05-01

## 2018-05-01 RX ORDER — SODIUM CHLORIDE 9 MG/ML
1000 INJECTION, SOLUTION INTRAVENOUS
Status: DISCONTINUED | OUTPATIENT
Start: 2018-05-01 | End: 2018-05-02 | Stop reason: HOSPADM

## 2018-05-01 RX ORDER — ACETAMINOPHEN 500 MG
1000 TABLET ORAL EVERY 6 HOURS
Status: DISCONTINUED | OUTPATIENT
Start: 2018-05-01 | End: 2018-05-02 | Stop reason: HOSPADM

## 2018-05-01 RX ORDER — LIDOCAINE HYDROCHLORIDE 10 MG/ML
INJECTION, SOLUTION INFILTRATION; PERINEURAL
Status: COMPLETED
Start: 2018-05-01 | End: 2018-05-01

## 2018-05-01 RX ADMIN — LIDOCAINE HYDROCHLORIDE 0.2 ML: 10 INJECTION, SOLUTION INFILTRATION; PERINEURAL at 13:20

## 2018-05-01 RX ADMIN — SODIUM CHLORIDE 1000 ML: 900 INJECTION, SOLUTION INTRAVENOUS at 13:20

## 2018-05-01 RX ADMIN — ACETAMINOPHEN 1000 MG: 500 TABLET, COATED ORAL at 17:26

## 2018-05-01 ASSESSMENT — PAIN SCALES - GENERAL
PAINLEVEL_OUTOF10: 0

## 2018-05-01 NOTE — OR NURSING
Patient allergies and NPO status verified, home medication reconciliation completed, belongings secured. Patient verbalizes understanding of pain scale, expected course of stay and plan of care. Surgical site verified with patient, IV access established sequentials placed on RLE.

## 2018-05-01 NOTE — OR SURGEON
Immediate Post OP Note    PreOp Diagnosis: Left bunion DJD and hallux ulcer    PostOp Diagnosis: same    Procedure(s):  BUNIONECTOMY - VILLAGOMEZ - Wound Class: Clean    Surgeon(s):  Jono Shipman D.P.M.    Anesthesiologist/Type of Anesthesia:  Anesthesiologist: Paul Nava M.D./General    Surgical Staff:  Circulator: Tonia Levy R.N.  Relief Scrub: Vineet Pires  Scrub Person: Ajit Castaneda    Specimens removed if any:  * No specimens in log *    Estimated Blood Loss: less than 5cc    Findings: none    Complications: none        5/1/2018 4:29 PM Jono Shipman D.P.M.

## 2018-05-02 PROCEDURE — 700102 HCHG RX REV CODE 250 W/ 637 OVERRIDE(OP): Performed by: PODIATRIST

## 2018-05-02 PROCEDURE — A9270 NON-COVERED ITEM OR SERVICE: HCPCS | Performed by: PODIATRIST

## 2018-05-02 NOTE — OR NURSING
Dressing clean and dry.  Patient tolerated OOB well NWB operative foot until block wears off. Patient and his wife verbalize good understanding of discharge instructions.

## 2018-05-02 NOTE — OP REPORT
DATE OF SERVICE:  05/01/2018    LOCATION:  [facility] outpatient surgery    PREOPERATIVE DIAGNOSES:  1.  Left foot hallux interphalangeal joint plantar full thickness ulceration   to subcutaneous tissue.  2.  Left first metatarsophalangeal joints bunion.  3.  Left first metatarsophalangeal joints hallux rigidus/degenerative joint   disease.    POSTOPERATIVE DIAGNOSES:  1.  Left foot hallux interphalangeal joint plantar full thickness ulceration   to subcutaneous tissue.  2.  Left first metatarsophalangeal joints bunion.  3.  Left first metatarsophalangeal joints hallux rigidus/degenerative joint   disease.    PROCEDURES:  1.  Left foot Shin bunionectomy.  2.  Left hallux interphalangeal joint exostectomy medial IPJ.  3.  Left plantar hallux ulcer full thickness debridement to subcutaneous   tissue.  Anesthesia was obtained via general anesthesia supplemented with a   preoperative popliteal sciatic block performed by anesthesia for postoperative   pain management.    HEMOSTASIS:  Hemostasis was obtained via pneumatic ankle tourniquet inflated   to 270 mmHg.    ESTIMATED BLOOD LOSS:  Less than 5 mL    PROCEDURE IN DETAIL:  The patient was brought to the operating room and placed   on the operating table in the supine position.  After adequate sedation per   anesthesia, general anesthesia was induced.  Following induction of general   anesthesia, the patient's left lower extremity was scrubbed, prepped and   draped in the usual aseptic manner.  Foot was exsanguinated and the tourniquet   was inflated.    Attention was first directed to the left hallux interphalangeal joint where a   dorsal linear incision was made over the IPJ.  The incision was deepened   through the skin and subcutaneous tissue.  Care was taken to identify and   retract all vital neurovascular structures and all bleeders were cauterized as   necessary.  Dissection continued down to the level of the IPJ and care was   taken to identify all  neurovascular structures and all bleeders were   cauterized as necessary.  At this time, the extensor apparatus was reflected   and Bovie cauterization along with sharp dissection was used to expose the   IPJ.  He was noted a large exostosis at the medial IPJ, which was removed via   the sagittal saw, there was also a loose body medially.  Some dorsal exostosis   production was removed as well with a sagittal saw and a rongeur was used as   well.  At this time, the wound was flushed with copious amounts of sterile   normal saline.  The extensor carranza was reapproximated with 3-0 Vicryl as was   all deep subcutaneous tissue, superficial subQ was reapproximated with 4-0   Vicryl and the skin was reapproximated with 5-0 nylon.    Attention was directed to the first MPJ where a linear longitudinal incision   was made medial to the EHL tendon.  The incision was deepened through the skin   and subcutaneous tissue.  Care was taken to identify and retract all vital   neurovascular structures and all bleeders were cauterized as necessary.    Dissection continued down to the level of the joint.  At this time, the entire   joint capsule was exposed, and an inverted-L capsulotomy was performed   exposing the first MPJ.  At this time, the prominent medial eminence was   removed at the first metatarsal head.  After the bunion was removed, the   dorsal exostosis pressure was removed as well on the first metatarsal head.    At this time, the extensor carranza was retracted laterally and the entire   proximal phalanx base was exposed.  Next, utilizing the sagittal saw, the   Shin procedure was performed and the base of the hallux was removed, it   should be noted that the entire base of the hallux was calcified and had   almost no viable cartilage.  The base was deformed and trumpeted.  After the   Shin procedure was performed, the first MPJ range of motion was   significantly improved.  He went from approximately 10 degrees of  dorsiflexion   to approximately 75 degrees range of motion.  After the joint decompression   procedure was performed, the wound was flushed with copious amounts of sterile   normal saline.  At this time, the joint capsule was reapproximated with 3-0   Vicryl.  All deep subcutaneous tissue was reapproximated with 3-0 Vicryl,   superficial subQ was reapproximated with 4-0 Vicryl and the skin was   reapproximated with 4-0 and 5-0 nylon.    At this time, the plantar ulcer was debrided via sharp dissection and the #15   blade down to healthy bleeding tissue.  The wound centrally was debrided down   to subcutaneous tissue and all hyperkeratotic tissue at the border was   debrided as well.  At this time, the wound was flushed with copious amounts of   sterile normal saline.    Next, the incision in plantar ulceration were dressed with Xeroform   nonadherent dressing and a sterile dressing was applied to the left foot.  The   tourniquet was deflated and immediate hyperemia was noted to extend to all   digits followed by normal capillary fill time.  He tolerated the procedure and   anesthesia well and left the operating room for recovery with vital signs   stable and vascular status intact to all digits.  Following a brief period of   postoperative monitoring, he will be discharged home with written and oral   postoperative instructions.       ____________________________________     VALERIA CALVILLO MD MBA / BREANNA    DD:  05/01/2018 16:40:22  DT:  05/01/2018 19:49:23    D#:  [id number]  Job#:  [id number]

## 2018-05-02 NOTE — OR NURSING
1620 To PACU from OR via gurney, sleeping, respirations spontaneous and non-labored via OPA. Elevation provided and Icepacks applied over c/d/i left foot surgical dressings. Plan to keep pt in PACU for full hour per [facility] protocol.    1635 Pt rouses spontaneously, LMA removed. Pt denies pain or nausea. Pt tolerating sips of water.    1650 No changes. Pt's contact updated.    1705 No changes.    1725 No changes. Scheduled Tylenol administered. Meets criteria to transfer to Stage 2.     1730 Report to ZACH Angulo.

## 2018-05-02 NOTE — DISCHARGE INSTRUCTIONS
ACTIVITY: Rest and take it easy for the first 24 hours.  A responsible adult is recommended to remain with you during that time.  It is normal to feel sleepy.  We encourage you to not do anything that requires balance, judgment or coordination.    MILD FLU-LIKE SYMPTOMS ARE NORMAL. YOU MAY EXPERIENCE GENERALIZED MUSCLE ACHES, THROAT IRRITATION, HEADACHE AND/OR SOME NAUSEA.    FOR 24 HOURS DO NOT:  Drive, operate machinery or run household appliances.  Drink beer or alcoholic beverages.   Make important decisions or sign legal documents.    SPECIAL INSTRUCTIONS: Partial weight bearing to lower left extremity (50%). Ice & elevate. No ice on toes.      DIET: To avoid nausea, slowly advance diet as tolerated, avoiding spicy or greasy foods for the first day.  Add more substantial food to your diet according to your physician's instructions.  Babies can be fed formula or breast milk as soon as they are hungry.  INCREASE FLUIDS AND FIBER TO AVOID CONSTIPATION.    SURGICAL DRESSING/BATHING: Keep dressing clean, dry and intact until instructed otherwise by surgeon.    FOLLOW-UP APPOINTMENT:  As scheduled.    You should CALL YOUR PHYSICIAN if you develop:  Fever greater than 101 degrees F.  Pain not relieved by medication, or persistent nausea or vomiting.  Excessive bleeding (blood soaking through dressing) or unexpected drainage from the wound.  Extreme redness or swelling around the incision site, drainage of pus or foul smelling drainage.  Inability to urinate or empty your bladder within 8 hours.  Problems with breathing or chest pain.    You should call 911 if you develop problems with breathing or chest pain.  If you are unable to contact your doctor or surgical center, you should go to the nearest emergency room or urgent care center.  Physician's telephone #: Dr Shipman [phone]    If any questions arise, call your doctor.  If your doctor is not available, please feel free to call the Surgical Center at  [phone].  The Center is open Monday through Friday from 7AM to 7PM.  You can also call the HEALTH HOTLINE open 24 hours/day, 7 days/week and speak to a nurse at [phone], or toll free at [phone].    A registered nurse may call you a few days after your surgery to see how you are doing after your procedure.    MEDICATIONS: Resume taking daily medication.  Take prescribed pain medication with food.  If no medication is prescribed, you may take non-aspirin pain medication if needed.  PAIN MEDICATION CAN BE VERY CONSTIPATING.  Take a stool softener or laxative such as senokot, pericolace, or milk of magnesia if needed.    Prescriptions given preop & here (Keflex).  Last pain medication given at ______________.    If your physician has prescribed pain medication that includes Acetaminophen (Tylenol), do not take additional Acetaminophen (Tylenol) while taking the prescribed medication.      Discharge Education for patients on BRE (Obstructive Sleep Apnea) Protocol    Prior to receiving sedation or anesthesia, we screen all patients for Obstructive Sleep Apnea.  During your screening, you were identified as having suspected, but not confirmed Obstructive Sleep Apnea(BRE).    What is Obstructive Sleep Apnea?  Sleep apnea (AP-ne-ah) is a common disorder which involves breathing pauses that occur during sleep.  These can last from 10 seconds to a minute or longer.  Normal breathing resumes often with a loud snort or choking sound.    Sleep apnea occurs in all age groups and both genders but is more common in men and people over 40 years of age.  It has been estimated that as many as 18 million Americans have sleep apnea.  Most people who have sleep apnea don’t know they have it because it only occurs during sleep.  A family member and/or bed partner may first notice the signs of sleep apnea.  Sleep apnea is a chronic (ongoing) condition that disrupts the quality and quantity of your sleep repeatedly  throughout the night.  This often results in excessive daytime sleepiness or fatigue during the day.  It may also contribute to high blood pressure, heart problems, and complications following medications used for surgery and procedures.    To establish a definitive diagnosis, further testing from a specialist would be needed.  We recommend that you follow up with your primary care physician.    We recommend that you should be with an adult observer for at least 24 hours after your sedation/anesthesia.  If you have a CPAP machine, you should wear it during any sleep period (day or night) for the week following your procedure.  We encourage you to sleep on your side or in a sitting position, even with napping.  Lying flat on your back increases the risk of apnea and airway obstruction during your post procedure recovery period.    It is important to prevent over-sedation that could increase your risk for apnea.  Please take all pain medication as directed by your physician.  If you are not getting pain relief, please contact your physician to discuss possible approaches to relieving pain while minimizing medications that can affect your breathing and oxygen levels.        Peripheral Nerve Block Discharge Instructions from Same Day Surgery and Inpatient :    What to Expect - Lower Extremity  · The block may cause you to experience numbness and weakness in your hip and thigh, thigh and knee or calf and foot on the same side as your surgery  · Numbness, tingling and / or weakness are all normal. For some people, this may be an unpleasant sensation  · These issues will be resolved when the local anesthetic wears off   · You may experience numbness and tingling in your thigh on the same side as your surgery if the block medicine was injected at your groin area  · Numbness will make it difficult to walk  · You may have problems with balance and walking so be very careful   · Follow your surgeon's direction regarding weight  "bearing on your surgical limb  · Be very careful with your numb limb  Precautions  · The numbness may affect your balance  · Be careful when walking or moving around  · Your leg may be weak: be very careful putting weight on it  · If your surgeon did not specify a time, you should not bear weight for 24 hours  · Be sure to ask for help when you need it  · It is better to have help than to fall and hurt yourself  Prevent Injury  · Protect the limb like a baby  · Beware of exposing your limb to extreme heat or cold or trauma  · The limb may be injured without you noticing because it is numb  · Keep the limb elevated whenever possible  · Do not sleep on the limb  · Change the position of the limb regularly  · Avoid putting pressure on your surgical limb  Pain Control  · The initial block on the day of surgery will make your extremity feel \"numb\"  · Any consecutive injection including prior to discharge from the hospital will make your extremity feel \"numb\"  · You may feel an aching or burning when the local anesthesia starts to wear off  · Take pain pills as prescribed by your surgeon  · Call your surgeon or anesthesiologist if you do not have adequate pain control    Depression / Suicide Risk    As you are discharged from this [facility] facility, it is important to learn how to keep safe from harming yourself.    Recognize the warning signs:  · Abrupt changes in personality, positive or negative- including increase in energy   · Giving away possessions  · Change in eating patterns- significant weight changes-  positive or negative  · Change in sleeping patterns- unable to sleep or sleeping all the time   · Unwillingness or inability to communicate  · Depression  · Unusual sadness, discouragement and loneliness  · Talk of wanting to die  · Neglect of personal appearance   · Rebelliousness- reckless behavior  · Withdrawal from people/activities they love  · Confusion- inability to concentrate     If you or a loved " one observes any of these behaviors or has concerns about self-harm, here's what you can do:  · Talk about it- your feelings and reasons for harming yourself  · Remove any means that you might use to hurt yourself (examples: pills, rope, extension cords, firearm)  · Get professional help from the community (Mental Health, Substance Abuse, psychological counseling)  · Do not be alone:Call your Safe Contact- someone whom you trust who will be there for you.  · Call your local CRISIS HOTLINE 278-5474 or 440-554-5121  · Call your local Children's Mobile Crisis Response Team [place] (910) 995-9918 or www.[employer]  · Call the toll free National Suicide Prevention Hotlines   · National Suicide Prevention Lifeline 348-098-IPKO (5417)  National Hope Line Network 800-SUICIDE (721-8561)

## 2018-05-16 DIAGNOSIS — I10 ESSENTIAL HYPERTENSION: ICD-10-CM

## 2018-05-16 RX ORDER — ATORVASTATIN CALCIUM 40 MG/1
TABLET, FILM COATED ORAL
Refills: 0 | OUTPATIENT
Start: 2018-05-16

## 2018-05-16 RX ORDER — LISINOPRIL 10 MG/1
TABLET ORAL
Refills: 0 | OUTPATIENT
Start: 2018-05-16

## 2018-05-16 RX ORDER — ATORVASTATIN CALCIUM 40 MG/1
TABLET, FILM COATED ORAL
Qty: 30 TAB | Refills: 5 | Status: SHIPPED | OUTPATIENT
Start: 2018-05-16 | End: 2018-11-07 | Stop reason: SDUPTHER

## 2018-05-16 RX ORDER — LISINOPRIL 10 MG/1
TABLET ORAL
Qty: 30 TAB | Refills: 5 | Status: SHIPPED | OUTPATIENT
Start: 2018-05-16 | End: 2018-11-07 | Stop reason: SDUPTHER

## 2018-09-04 NOTE — TELEPHONE ENCOUNTER
Patient was sent my chart message in August which is unread. Please call him and advised him to go in for labs, orders are in Epic

## 2018-09-18 ENCOUNTER — HOSPITAL ENCOUNTER (OUTPATIENT)
Dept: LAB | Facility: MEDICAL CENTER | Age: 53
End: 2018-09-18
Attending: NURSE PRACTITIONER
Payer: COMMERCIAL

## 2018-09-18 LAB
ALBUMIN SERPL BCP-MCNC: 4.1 G/DL (ref 3.2–4.9)
ALBUMIN/GLOB SERPL: 1.4 G/DL
ALP SERPL-CCNC: 101 U/L (ref 30–99)
ALT SERPL-CCNC: 59 U/L (ref 2–50)
ANION GAP SERPL CALC-SCNC: 9 MMOL/L (ref 0–11.9)
AST SERPL-CCNC: 25 U/L (ref 12–45)
BILIRUB SERPL-MCNC: 1.7 MG/DL (ref 0.1–1.5)
BUN SERPL-MCNC: 15 MG/DL (ref 8–22)
CALCIUM SERPL-MCNC: 9.2 MG/DL (ref 8.5–10.5)
CHLORIDE SERPL-SCNC: 106 MMOL/L (ref 96–112)
CO2 SERPL-SCNC: 24 MMOL/L (ref 20–33)
CREAT SERPL-MCNC: 1.12 MG/DL (ref 0.5–1.4)
EST. AVERAGE GLUCOSE BLD GHB EST-MCNC: 186 MG/DL
FASTING STATUS PATIENT QL REPORTED: NORMAL
GLOBULIN SER CALC-MCNC: 2.9 G/DL (ref 1.9–3.5)
GLUCOSE SERPL-MCNC: 174 MG/DL (ref 65–99)
HBA1C MFR BLD: 8.1 % (ref 0–5.6)
POTASSIUM SERPL-SCNC: 4.3 MMOL/L (ref 3.6–5.5)
PROT SERPL-MCNC: 7 G/DL (ref 6–8.2)
SODIUM SERPL-SCNC: 139 MMOL/L (ref 135–145)

## 2018-09-18 PROCEDURE — 36415 COLL VENOUS BLD VENIPUNCTURE: CPT

## 2018-09-18 PROCEDURE — 83036 HEMOGLOBIN GLYCOSYLATED A1C: CPT

## 2018-09-18 PROCEDURE — 80053 COMPREHEN METABOLIC PANEL: CPT

## 2018-09-21 ENCOUNTER — OFFICE VISIT (OUTPATIENT)
Dept: MEDICAL GROUP | Facility: MEDICAL CENTER | Age: 53
End: 2018-09-21
Payer: COMMERCIAL

## 2018-09-21 VITALS
BODY MASS INDEX: 36.45 KG/M2 | DIASTOLIC BLOOD PRESSURE: 88 MMHG | TEMPERATURE: 97.6 F | OXYGEN SATURATION: 100 % | SYSTOLIC BLOOD PRESSURE: 135 MMHG | HEIGHT: 78 IN | RESPIRATION RATE: 16 BRPM | WEIGHT: 315 LBS | HEART RATE: 73 BPM

## 2018-09-21 DIAGNOSIS — E11.319 DIABETIC RETINOPATHY OF BOTH EYES WITHOUT MACULAR EDEMA ASSOCIATED WITH TYPE 2 DIABETES MELLITUS, UNSPECIFIED RETINOPATHY SEVERITY (HCC): ICD-10-CM

## 2018-09-21 DIAGNOSIS — E78.00 PURE HYPERCHOLESTEROLEMIA: ICD-10-CM

## 2018-09-21 DIAGNOSIS — I10 ESSENTIAL HYPERTENSION: ICD-10-CM

## 2018-09-21 DIAGNOSIS — Z23 NEED FOR INFLUENZA VACCINATION: ICD-10-CM

## 2018-09-21 PROBLEM — E11.621 DIABETIC ULCER OF TOE OF LEFT FOOT ASSOCIATED WITH TYPE 2 DIABETES MELLITUS, LIMITED TO BREAKDOWN OF SKIN (HCC): Status: RESOLVED | Noted: 2018-01-16 | Resolved: 2018-09-21

## 2018-09-21 PROBLEM — E78.5 HYPERLIPIDEMIA: Status: ACTIVE | Noted: 2018-09-21

## 2018-09-21 PROBLEM — L97.521 DIABETIC ULCER OF TOE OF LEFT FOOT ASSOCIATED WITH TYPE 2 DIABETES MELLITUS, LIMITED TO BREAKDOWN OF SKIN (HCC): Status: RESOLVED | Noted: 2018-01-16 | Resolved: 2018-09-21

## 2018-09-21 PROCEDURE — 99214 OFFICE O/P EST MOD 30 MIN: CPT | Mod: 25 | Performed by: NURSE PRACTITIONER

## 2018-09-21 PROCEDURE — 90471 IMMUNIZATION ADMIN: CPT | Performed by: NURSE PRACTITIONER

## 2018-09-21 PROCEDURE — 90686 IIV4 VACC NO PRSV 0.5 ML IM: CPT | Performed by: NURSE PRACTITIONER

## 2018-09-21 ASSESSMENT — PATIENT HEALTH QUESTIONNAIRE - PHQ9: CLINICAL INTERPRETATION OF PHQ2 SCORE: 0

## 2018-09-21 NOTE — ASSESSMENT & PLAN NOTE
Initial blood pressure slightly elevated in the office today at 144/82, repeat 135/88  Consistent with lisinopril 10 mg daily  No chest pain, dizziness, activity intolerance

## 2018-09-21 NOTE — PROGRESS NOTES
"RN-CDE Note    Subjective:     Health changes since last visit/interval Hx: taking metformin 1000 mg twice daily, glipizide 10 mg twice daily and weekly bydureon.  He had surgery on his foot in May    Medications (including changes made today)  Current Outpatient Prescriptions   Medication Sig Dispense Refill   • metformin (GLUCOPHAGE) 1000 MG tablet TAKE 1 TABLET BY MOUTH TWICE DAILY WITH MEALS 60 Tab 0   • lisinopril (PRINIVIL) 10 MG Tab TAKE 1 TABLET BY MOUTH EVERY DAY 30 Tab 5   • atorvastatin (LIPITOR) 40 MG Tab TAKE 1 TABLET BY MOUTH EVERY DAY 30 Tab 5   • glipiZIDE (GLUCOTROL) 10 MG Tab TAKE 1 TABLET BY MOUTH TWICE DAILY 60 Tab 11   • aspirin (ASA) 325 MG Tab Take 325 mg by mouth every day.     • ONE TOUCH ULTRA TEST strip USE ONE STRIP FOR BLOOD SUGAR TEST THREE TIMES DAILY 100 Strip 11   • Exenatide ER (BYDUREON) 2 MG Pen-injector Inject 2 mg as instructed every 7 days. 4 Each 11     No current facility-administered medications for this visit.        Taking daily ASA: Yes  Taking above medications as prescribed: yes  SIDE EFFECTS: Patient denies side effects to medications    Exercise: no regular exercise, sedentary  Diet: \"healthy\" diet  in general  Patient's body mass index is 35.36 kg/m². Exercise and nutrition counseling were performed at this visit.      Health Maintenance:   Health Maintenance Due   Topic Date Due   • IMM HEP B VACCINE (1 of 3 - Risk 3-dose series) 02/14/1982   • IMM ZOSTER VACCINES (1 of 2) 02/14/2013   • IMM INFLUENZA (1) 09/01/2018       Immunizations:   PPSV23: not asked  Sudbeev13: not asked  Tdap: not asked  Flu: not asked  Hep B: not asked    DM:   Last A1c:   Lab Results   Component Value Date/Time    HBA1C 8.1 (H) 09/18/2018 07:19 AM      A1C GOAL: < 7    Glucose monitoring frequency: daily    Hypoglycemic episodes: yes - 3x/past month    Last Retinal Exam: on file and up-to-date  Daily Foot Exam: Yes no problems  Routine Dental Exams: Yes    Lab Results   Component " Value Date/Time    MALBCRT 10 01/09/2018 11:15 AM    MICROALBUR 1.8 01/09/2018 11:15 AM        ACR Albumin/Creatinine Ratio goal <30     HTN:   Blood pressure goal <140/<80 no.   Currently Rx ACE/ARB: Yes    Dyslipidemia:    Lab Results   Component Value Date/Time    CHOLSTRLTOT 88 (L) 01/09/2018 11:16 AM    LDL 43 01/09/2018 11:16 AM    HDL 29 (A) 01/09/2018 11:16 AM    TRIGLYCERIDE 82 01/09/2018 11:16 AM       Lab Results   Component Value Date/Time    SODIUM 139 09/18/2018 07:19 AM    POTASSIUM 4.3 09/18/2018 07:19 AM    CHLORIDE 106 09/18/2018 07:19 AM    CO2 24 09/18/2018 07:19 AM    GLUCOSE 174 (H) 09/18/2018 07:19 AM    BUN 15 09/18/2018 07:19 AM    CREATININE 1.12 09/18/2018 07:19 AM     Lab Results   Component Value Date/Time    ALKPHOSPHAT 101 (H) 09/18/2018 07:19 AM    ASTSGOT 25 09/18/2018 07:19 AM    ALTSGPT 59 (H) 09/18/2018 07:19 AM    TBILIRUBIN 1.7 (H) 09/18/2018 07:19 AM        Currently Rx Statin: Yes    He  reports that he has never smoked. He has never used smokeless tobacco.    Objective:     Exam:  Monofilament: not done    Plan:     Discussed and educated on:   - All medications, side effects and compliance (discussed carefully)  - Annual eye examinations at Ophthalmology  - Foot Care: what to look for when checking feet every day and when to contact HCP  - HbA1C: target and what A1C is  - Home glucose monitoring emphasized  - Interpretation of Lab Results  - Testing Blood Glucose: when to test, recording blood sugars and target range for FSBS  - Weight control and daily exercise    Recommended medication changes: stop Januvia, stop Glipizide, start Jardiance 10 mg, send FSBG in 1 week    Subjective:     Chief Complaint   Patient presents with   • Follow-Up     DIABETES     Hansel Burgos is a 53 y.o. male here today to follow up on:    Diabetes mellitus type 2, uncontrolled, with complications  Uncontrolled at 8.1  Above note reviewed  No polyuria, polydipsia, numbness or  "tingling in ext.  Foot ulcer healed      Essential hypertension  Initial blood pressure slightly elevated in the office today at 144/82, repeat 135/88  Consistent with lisinopril 10 mg daily  No chest pain, dizziness, activity intolerance    Hyperlipidemia  Well-controlled on atorvastatin, tolerating medication without side effect including myalgia    Diabetic retinopathy  Followed by Dr. García, stable       Current medicines (including changes today)  Current Outpatient Prescriptions   Medication Sig Dispense Refill   • Empagliflozin (JARDIANCE) 10 MG Tab Take 10 mg by mouth every morning before breakfast. 30 Tab 11   • metformin (GLUCOPHAGE) 1000 MG tablet TAKE 1 TABLET BY MOUTH TWICE DAILY WITH MEALS 60 Tab 0   • lisinopril (PRINIVIL) 10 MG Tab TAKE 1 TABLET BY MOUTH EVERY DAY 30 Tab 5   • atorvastatin (LIPITOR) 40 MG Tab TAKE 1 TABLET BY MOUTH EVERY DAY 30 Tab 5   • glipiZIDE (GLUCOTROL) 10 MG Tab TAKE 1 TABLET BY MOUTH TWICE DAILY 60 Tab 11   • aspirin (ASA) 325 MG Tab Take 325 mg by mouth every day.     • ONE TOUCH ULTRA TEST strip USE ONE STRIP FOR BLOOD SUGAR TEST THREE TIMES DAILY 100 Strip 11   • Exenatide ER (BYDUREON) 2 MG Pen-injector Inject 2 mg as instructed every 7 days. 4 Each 11     No current facility-administered medications for this visit.      He  has a past medical history of Arthritis; Diabetes (HCC); High cholesterol; Hyperlipidemia; Hypertension; and Pain.    ROS included above     Objective:     Blood pressure 135/88, pulse 73, temperature 36.4 °C (97.6 °F), temperature source Temporal, resp. rate 16, height 2.057 m (6' 9\"), weight (!) 149.7 kg (330 lb), SpO2 100 %. Body mass index is 35.36 kg/m².     Physical Exam:  General: Alert, oriented in no acute distress.  Eye contact is good, speech is normal, affect calm  Lungs: clear to auscultation bilaterally, normal effort, no wheeze/ rhonchi/ rales.  CV: regular rate and rhythm, S1, S2, no murmur  Abdomen: soft, nontender  Ext: no edema, " color normal, vascularity normal, temperature normal    Assessment and Plan:   The following treatment plan was discussed   1. Uncontrolled type 2 diabetes mellitus with complication, without long-term current use of insulin (HCC)  Medication changes as detailed above. Labs in Dec  LIPID PROFILE    COMP METABOLIC PANEL    HEMOGLOBIN A1C    MICROALBUMIN CREAT RATIO URINE   2. Need for influenza vaccination  I have placed the below orders and discussed them with an approved delegating provider. The MA is performing the below orders under the direction of Dr. Bean  Flu Quad Inj >3 Year Pre-Filled PF   3. Essential hypertension  Continue to monitor   4. Pure hypercholesterolemia  Continue medication   5. Diabetic retinopathy of both eyes without macular edema associated with type 2 diabetes mellitus, unspecified retinopathy severity (HCC)  Continue f/u with oph       Followup:3 months         Please note that this dictation was created using voice recognition software. I have worked with consultants from the vendor as well as technical experts from [employer] to optimize the interface. I have made every reasonable attempt to correct obvious errors, but I expect that there are errors of grammar and possibly content that I did not discover before finalizing the note.

## 2018-09-21 NOTE — ASSESSMENT & PLAN NOTE
Uncontrolled at 8.1  Above note reviewed  No polyuria, polydipsia, numbness or tingling in ext.  Foot ulcer healed

## 2019-04-05 ENCOUNTER — HOSPITAL ENCOUNTER (OUTPATIENT)
Dept: RADIOLOGY | Facility: MEDICAL CENTER | Age: 54
End: 2019-04-05
Attending: NURSE PRACTITIONER
Payer: COMMERCIAL

## 2019-04-05 ENCOUNTER — OFFICE VISIT (OUTPATIENT)
Dept: MEDICAL GROUP | Facility: MEDICAL CENTER | Age: 54
End: 2019-04-05
Payer: COMMERCIAL

## 2019-04-05 VITALS
BODY MASS INDEX: 35.29 KG/M2 | SYSTOLIC BLOOD PRESSURE: 122 MMHG | HEART RATE: 68 BPM | RESPIRATION RATE: 16 BRPM | HEIGHT: 78 IN | DIASTOLIC BLOOD PRESSURE: 70 MMHG | TEMPERATURE: 96.6 F | OXYGEN SATURATION: 99 % | WEIGHT: 305 LBS

## 2019-04-05 DIAGNOSIS — R10.31 RIGHT INGUINAL PAIN: ICD-10-CM

## 2019-04-05 DIAGNOSIS — R20.0 NUMBNESS IN RIGHT LEG: ICD-10-CM

## 2019-04-05 PROCEDURE — 72100 X-RAY EXAM L-S SPINE 2/3 VWS: CPT

## 2019-04-05 PROCEDURE — 99214 OFFICE O/P EST MOD 30 MIN: CPT | Performed by: NURSE PRACTITIONER

## 2019-04-05 ASSESSMENT — PATIENT HEALTH QUESTIONNAIRE - PHQ9: CLINICAL INTERPRETATION OF PHQ2 SCORE: 0

## 2019-04-05 NOTE — ASSESSMENT & PLAN NOTE
Intermittent issues over the last few weeks. Exacerbated by periods of sitting, sore after physical exertion but not necessarily during activity itself.

## 2019-04-07 NOTE — PROGRESS NOTES
"Subjective:     Chief Complaint   Patient presents with   • Hernia     Hansel Burgos is a 54 y.o. male here today to follow up on:    Right inguinal pain  Intermittent issues over the last few weeks. Exacerbated by periods of sitting, sore after physical exertion but not necessarily during activity itself. He has been traveling in an RV, has a hard time getting comfortable while driving. Pain can be 7/10, aching at times. No notable bulge in the groin although he did feel a \"knot\" under the skin at one point.  He reports h/o back injury as a teen, lumbar back fracture. He has had numbness in the R thigh which has been persistent since that time. He does not have any new/ changed back pain a this time.   No LE weakness, saddle anesthesia, bowel or bladder changes. No testicular swelling. No h/o renal calculi       Current medicines (including changes today)  Current Outpatient Prescriptions   Medication Sig Dispense Refill   • BYDUREON 2 MG Pen-injector INJECT ONE SYRINGE UNDER THE SKIN ONCE A WEEK 12 Each 1   • atorvastatin (LIPITOR) 40 MG Tab TAKE 1 TABLET BY MOUTH EVERY DAY 90 Tab 3   • lisinopril (PRINIVIL) 10 MG Tab TAKE 1 TABLET BY MOUTH EVERY DAY 90 Tab 3   • metformin (GLUCOPHAGE) 1000 MG tablet TAKE 1 TABLET BY MOUTH TWICE DAILY WITH MEALS 180 Tab 3   • Empagliflozin (JARDIANCE) 10 MG Tab Take 10 mg by mouth every morning before breakfast. 30 Tab 11   • glipiZIDE (GLUCOTROL) 10 MG Tab TAKE 1 TABLET BY MOUTH TWICE DAILY 60 Tab 11   • aspirin (ASA) 325 MG Tab Take 325 mg by mouth every day.     • ONE TOUCH ULTRA TEST strip USE ONE STRIP FOR BLOOD SUGAR TEST THREE TIMES DAILY 100 Strip 11     No current facility-administered medications for this visit.      He  has a past medical history of Arthritis; Diabetes (HCC); High cholesterol; Hyperlipidemia; Hypertension; and Pain.    ROS included above     Objective:     /70 (BP Location: Left arm, Patient Position: Sitting, BP Cuff Size: Adult)   " "Pulse 68   Temp 35.9 °C (96.6 °F) (Temporal)   Resp 16   Ht 2.057 m (6' 9\")   Wt (!) 138.3 kg (305 lb)   SpO2 99%  Body mass index is 32.68 kg/m².     Physical Exam:  General: Alert, oriented in no acute distress.  Eye contact is good, speech is normal, affect calm  Lungs: clear to auscultation bilaterally, normal effort, no wheeze/ rhonchi/ rales.  CV: regular rate and rhythm, S1, S2, no murmur  Abdomen: soft, nontender, No inguinal tenderness or bulge  MS: no tenderness over lumbar spinous process. Strength 5/5 in bilateral LE. DTR 2+ bilaterally  Ext: no edema, color normal, vascularity normal, temperature normal    Assessment and Plan:   The following treatment plan was discussed   1. Right inguinal pain  Persistent R groin pain without obvious hernia on exam. Will obtain u/s to rule out hernia but this may be radicular pain from his back. Obtain lumbar xray. We will determine treatment plan pending results  US-INGUINAL HERNIA    DX-LUMBAR SPINE-2 OR 3 VIEWS    US-INGUINAL HERNIA   2. Numbness in right leg         Followup: pending imaging         Please note that this dictation was created using voice recognition software. I have worked with consultants from the vendor as well as technical experts from [facility] [employer] to optimize the interface. I have made every reasonable attempt to correct obvious errors, but I expect that there are errors of grammar and possibly content that I did not discover before finalizing the note.       "

## 2019-04-15 RX ORDER — GLIPIZIDE 10 MG/1
TABLET ORAL
Qty: 180 TAB | Refills: 1 | Status: SHIPPED | OUTPATIENT
Start: 2019-04-15 | End: 2019-05-21

## 2019-04-19 RX ORDER — EXENATIDE 2 MG/.65ML
INJECTION, SUSPENSION, EXTENDED RELEASE SUBCUTANEOUS
Qty: 12 EACH | Refills: 0 | Status: SHIPPED | OUTPATIENT
Start: 2019-04-19 | End: 2019-05-21

## 2019-04-20 ENCOUNTER — HOSPITAL ENCOUNTER (OUTPATIENT)
Dept: LAB | Facility: MEDICAL CENTER | Age: 54
End: 2019-04-20
Attending: NURSE PRACTITIONER
Payer: COMMERCIAL

## 2019-04-20 LAB
ALBUMIN SERPL BCP-MCNC: 4.4 G/DL (ref 3.2–4.9)
ALBUMIN/GLOB SERPL: 1.6 G/DL
ALP SERPL-CCNC: 106 U/L (ref 30–99)
ALT SERPL-CCNC: 27 U/L (ref 2–50)
ANION GAP SERPL CALC-SCNC: 11 MMOL/L (ref 0–11.9)
AST SERPL-CCNC: 20 U/L (ref 12–45)
BILIRUB SERPL-MCNC: 1.6 MG/DL (ref 0.1–1.5)
BUN SERPL-MCNC: 24 MG/DL (ref 8–22)
CALCIUM SERPL-MCNC: 9.3 MG/DL (ref 8.5–10.5)
CHLORIDE SERPL-SCNC: 107 MMOL/L (ref 96–112)
CHOLEST SERPL-MCNC: 89 MG/DL (ref 100–199)
CO2 SERPL-SCNC: 20 MMOL/L (ref 20–33)
CREAT SERPL-MCNC: 1.28 MG/DL (ref 0.5–1.4)
EST. AVERAGE GLUCOSE BLD GHB EST-MCNC: 160 MG/DL
FASTING STATUS PATIENT QL REPORTED: NORMAL
GLOBULIN SER CALC-MCNC: 2.8 G/DL (ref 1.9–3.5)
GLUCOSE SERPL-MCNC: 110 MG/DL (ref 65–99)
HBA1C MFR BLD: 7.2 % (ref 0–5.6)
HDLC SERPL-MCNC: 31 MG/DL
LDLC SERPL CALC-MCNC: 32 MG/DL
POTASSIUM SERPL-SCNC: 4 MMOL/L (ref 3.6–5.5)
PROT SERPL-MCNC: 7.2 G/DL (ref 6–8.2)
SODIUM SERPL-SCNC: 138 MMOL/L (ref 135–145)
TRIGL SERPL-MCNC: 128 MG/DL (ref 0–149)

## 2019-04-20 PROCEDURE — 36415 COLL VENOUS BLD VENIPUNCTURE: CPT

## 2019-04-20 PROCEDURE — 80053 COMPREHEN METABOLIC PANEL: CPT

## 2019-04-20 PROCEDURE — 82570 ASSAY OF URINE CREATININE: CPT

## 2019-04-20 PROCEDURE — 80061 LIPID PANEL: CPT

## 2019-04-20 PROCEDURE — 82043 UR ALBUMIN QUANTITATIVE: CPT

## 2019-04-20 PROCEDURE — 83036 HEMOGLOBIN GLYCOSYLATED A1C: CPT

## 2019-04-21 LAB
CREAT UR-MCNC: 87.3 MG/DL
MICROALBUMIN UR-MCNC: <0.7 MG/DL
MICROALBUMIN/CREAT UR: NORMAL MG/G (ref 0–30)

## 2019-04-24 ENCOUNTER — TELEPHONE (OUTPATIENT)
Dept: MEDICAL GROUP | Facility: MEDICAL CENTER | Age: 54
End: 2019-04-24

## 2019-04-24 NOTE — TELEPHONE ENCOUNTER
----- Message from VIRGINIA Ortiz sent at 4/23/2019  7:11 AM PDT -----  Please submit PAR for Victoza

## 2019-04-24 NOTE — TELEPHONE ENCOUNTER
Submitted urgent PAR, faxed recent A1C results. Will receive an approval or denial notice in 1-3 business days.

## 2019-04-30 ENCOUNTER — TELEPHONE (OUTPATIENT)
Dept: MEDICAL GROUP | Facility: MEDICAL CENTER | Age: 54
End: 2019-04-30

## 2019-04-30 NOTE — TELEPHONE ENCOUNTER
Patient is asking for new rx to replace the bydureon prescription, still have not heard back about his prior authorization.

## 2019-04-30 NOTE — TELEPHONE ENCOUNTER
Will need to call his insurance, I do not know what equivalent medications are covered on his plan.  He has already been on the Bydureon so ideally we need to get this authorized

## 2019-04-30 NOTE — TELEPHONE ENCOUNTER
Prior authorization has been approved for bydureon, has been scanned into the chart. Left voice message for the patient letting him know, and will send him a mychart message as well.

## 2019-05-21 ENCOUNTER — OFFICE VISIT (OUTPATIENT)
Dept: MEDICAL GROUP | Facility: MEDICAL CENTER | Age: 54
End: 2019-05-21
Payer: COMMERCIAL

## 2019-05-21 VITALS
BODY MASS INDEX: 35.28 KG/M2 | SYSTOLIC BLOOD PRESSURE: 130 MMHG | HEART RATE: 69 BPM | TEMPERATURE: 97.2 F | DIASTOLIC BLOOD PRESSURE: 64 MMHG | OXYGEN SATURATION: 100 % | WEIGHT: 304.9 LBS | HEIGHT: 78 IN

## 2019-05-21 DIAGNOSIS — E11.319 DIABETIC RETINOPATHY OF BOTH EYES WITHOUT MACULAR EDEMA ASSOCIATED WITH TYPE 2 DIABETES MELLITUS, UNSPECIFIED RETINOPATHY SEVERITY (HCC): ICD-10-CM

## 2019-05-21 DIAGNOSIS — M25.511 CHRONIC RIGHT SHOULDER PAIN: ICD-10-CM

## 2019-05-21 DIAGNOSIS — Z78.9 HEPATITIS VACCINATION STATUS UNKNOWN: ICD-10-CM

## 2019-05-21 DIAGNOSIS — Z78.9 MEASLES, MUMPS, RUBELLA (MMR) VACCINATION STATUS UNKNOWN: ICD-10-CM

## 2019-05-21 DIAGNOSIS — E11.621 DIABETIC FOOT ULCER ASSOCIATED WITH TYPE 2 DIABETES MELLITUS, UNSPECIFIED LATERALITY, UNSPECIFIED PART OF FOOT, UNSPECIFIED ULCER STAGE (HCC): ICD-10-CM

## 2019-05-21 DIAGNOSIS — G89.29 CHRONIC RIGHT SHOULDER PAIN: ICD-10-CM

## 2019-05-21 DIAGNOSIS — I10 ESSENTIAL HYPERTENSION: ICD-10-CM

## 2019-05-21 DIAGNOSIS — L97.509 DIABETIC FOOT ULCER ASSOCIATED WITH TYPE 2 DIABETES MELLITUS, UNSPECIFIED LATERALITY, UNSPECIFIED PART OF FOOT, UNSPECIFIED ULCER STAGE (HCC): ICD-10-CM

## 2019-05-21 PROCEDURE — 99214 OFFICE O/P EST MOD 30 MIN: CPT | Performed by: NURSE PRACTITIONER

## 2019-05-21 NOTE — PROGRESS NOTES
RN-CDE Note    Subjective:     Health changes since last visit/interval Hx: I last met with Hansel on 12/16/18.  At that time, Januvia was changed to Bydureon.  Since then, Jardiance was added and HbA1c decreased to 7.2 and he has lost 20 pounds.  He feels great off of glipizide, reports Bydureon not effective    Medications (including changes made today)  Current Outpatient Prescriptions   Medication Sig Dispense Refill   • BYDUREON 2 MG Pen-injector INJECT 2MG UNDER THE SKIN ONCE A WEEK 12 Each 0   • atorvastatin (LIPITOR) 40 MG Tab TAKE 1 TABLET BY MOUTH EVERY DAY 90 Tab 3   • lisinopril (PRINIVIL) 10 MG Tab TAKE 1 TABLET BY MOUTH EVERY DAY 90 Tab 3   • metformin (GLUCOPHAGE) 1000 MG tablet TAKE 1 TABLET BY MOUTH TWICE DAILY WITH MEALS 180 Tab 3   • Empagliflozin (JARDIANCE) 10 MG Tab Take 10 mg by mouth every morning before breakfast. 30 Tab 11   • ONE TOUCH ULTRA TEST strip USE ONE STRIP FOR BLOOD SUGAR TEST THREE TIMES DAILY 100 Strip 11   • aspirin (ASA) 325 MG Tab Take 325 mg by mouth every day.       No current facility-administered medications for this visit.        Taking daily ASA: Yes  Taking above medications as prescribed: yes  SIDE EFFECTS: Patient denies side effects to medications    Exercise: sporadic irregular exercise, <half hour walking weekly  Diet: meals per day on average: 2 + snacks/ low carb  Patient's body mass index is unknown because there is no height or weight on file. Exercise and nutrition counseling were performed at this visit.      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    DM:   Last A1c:   Lab Results   Component Value Date/Time    HBA1C 7.2  (H) 04/20/2019 08:28 AM      A1C GOAL: < 7    Glucose monitoring frequency: 2x/day    Hypoglycemic episodes: no    Last Retinal Exam: on file and up-to-date- request sent to Raquel for current results  Daily Foot Exam: Yes open sores bottom of left great toe, left outer foot and right ankle  Routine Dental Exams: Yes    Lab Results   Component Value Date/Time    MALBCRT see below 04/20/2019 08:28 AM    MICROALBUR <0.7 04/20/2019 08:28 AM        ACR Albumin/Creatinine Ratio goal <30     HTN:   Blood pressure goal <140/<80 yes.   Currently Rx ACE/ARB: Yes    Dyslipidemia:    Lab Results   Component Value Date/Time    CHOLSTRLTOT 89 (L) 04/20/2019 08:28 AM    LDL 32 04/20/2019 08:28 AM    HDL 31 (A) 04/20/2019 08:28 AM    TRIGLYCERIDE 128 04/20/2019 08:28 AM       Lab Results   Component Value Date/Time    SODIUM 138 04/20/2019 08:28 AM    POTASSIUM 4.0 04/20/2019 08:28 AM    CHLORIDE 107 04/20/2019 08:28 AM    CO2 20 04/20/2019 08:28 AM    GLUCOSE 110 (H) 04/20/2019 08:28 AM    BUN 24 (H) 04/20/2019 08:28 AM    CREATININE 1.28 04/20/2019 08:28 AM     Lab Results   Component Value Date/Time    ALKPHOSPHAT 106 (H) 04/20/2019 08:28 AM    ASTSGOT 20 04/20/2019 08:28 AM    ALTSGPT 27 04/20/2019 08:28 AM    TBILIRUBIN 1.6 (H) 04/20/2019 08:28 AM        Currently Rx Statin: Yes    He  reports that he has never smoked. He has never used smokeless tobacco.    Objective:     Exam:  Monofilament: done   Monofilament testing with a 10 gram force: sensation intact: decreased bilaterally  Visual Inspection: Feet with maceration, ulcers, fissures.  Pedal pulses: intact bilaterally    Plan:     Discussed and educated on:   - All medications, side effects and compliance (discussed carefully)  - Annual eye examinations at Ophthalmology  - Diabetic Meal Plan: foods that contain carbs and plate method  - Foot Care: what to look for when checking feet every day and when to contact HCP  - HbA1C: target and what A1C is  - Home  glucose monitoring emphasized  - Interpretation of Lab Results  - Long term diabetic complications  - Reminded pt to bring in BS diary at next visit  - Testing Blood Glucose: when to test, recording blood sugars, target range for FSBS and when to contact HCP  - Weight control and daily exercise    Recommended medication changes: Change Bydureon to Ozempic, F/U with me in 3 months

## 2019-05-21 NOTE — ASSESSMENT & PLAN NOTE
Significant improvement over the last several months with recent A1c of 7.2.  Note from diabetes RN reviewed today  Patient states that he has been feeling much better, he would like to eventually reduce his oral medication if possible.  He has continued to work on his diet and weight loss.  Recent labs do show very slight decline in kidney function, we will need to continue to monitor this.  He is working on hydration.  He also has some foot sores which are healing at this point.  No drainage or pain.  He has had neuropathy which is unchanged  Denies polyuria, polydipsia, headaches, vision change, change in urine output

## 2019-05-21 NOTE — PROGRESS NOTES
Subjective:     Chief Complaint   Patient presents with   • Diabetes     Hansel Burgos is a 54 y.o. male here today to follow up on:    Diabetes mellitus type 2, uncontrolled, with complications  Significant improvement over the last several months with recent A1c of 7.2.  Note from diabetes RN reviewed today  Patient states that he has been feeling much better, he would like to eventually reduce his oral medication if possible.  He has continued to work on his diet and weight loss.  Recent labs do show very slight decline in kidney function, we will need to continue to monitor this.  He is working on hydration.  He also has some foot sores which are healing at this point.  No drainage or pain.  He has had neuropathy which is unchanged  Denies polyuria, polydipsia, headaches, vision change, change in urine output    Diabetic retinopathy  Followed by Dr. García, reportedly stable.  Due for eye exam next month    Chronic right shoulder pain  Patient reports history of multiple shoulder injuries many years ago, shoulder was dislocated at one point.  His shoulder pain is becoming more of an issue in the last several months, now having discomfort when reaching behind himself. It is also hurting when he lays on his right side at night.  He is not taking any medication for this. Believes that he may need to start the process of looking into what may be wrong with his shoulder.  He is, however, going out of town for the next 3 months and does not want referral at this time.  Range of motion is intact.  No numbness, tingling, weakness in the arm. No joint swelling       Current medicines (including changes today)  Current Outpatient Prescriptions   Medication Sig Dispense Refill   • Semaglutide (OZEMPIC) 0.25 or 0.5 MG/DOSE Solution Pen-injector Inject 0.5 mg as instructed every 7 days. 3 PEN 3   • atorvastatin (LIPITOR) 40 MG Tab TAKE 1 TABLET BY MOUTH EVERY DAY 90 Tab 3   • lisinopril (PRINIVIL) 10 MG Tab  "TAKE 1 TABLET BY MOUTH EVERY DAY 90 Tab 3   • metformin (GLUCOPHAGE) 1000 MG tablet TAKE 1 TABLET BY MOUTH TWICE DAILY WITH MEALS 180 Tab 3   • Empagliflozin (JARDIANCE) 10 MG Tab Take 10 mg by mouth every morning before breakfast. 30 Tab 11   • ONE TOUCH ULTRA TEST strip USE ONE STRIP FOR BLOOD SUGAR TEST THREE TIMES DAILY 100 Strip 11   • aspirin (ASA) 325 MG Tab Take 325 mg by mouth every day.       No current facility-administered medications for this visit.      He  has a past medical history of Arthritis; Diabetes (HCC); High cholesterol; Hyperlipidemia; Hypertension; and Pain.    ROS included above     Objective:     /64   Pulse 69   Temp 36.2 °C (97.2 °F)   Ht 2.057 m (6' 9\")   Wt (!) 138.3 kg (304 lb 14.3 oz)   SpO2 100%  Body mass index is 32.67 kg/m².     Physical Exam:  General: Alert, oriented in no acute distress.  Eye contact is good, speech is normal, affect calm  Lungs: clear to auscultation bilaterally, normal effort, no wheeze/ rhonchi/ rales.  CV: regular rate and rhythm, S1, S2, no murmur  Abdomen: soft, nontender  MS no point tenderness over the anterior posterior right shoulder, no joint swelling, full range of motion.  Normal strength, negative empty can, negative drop arm   Ext: no edema, color normal, vascularity normal, temperature normal    Assessment and Plan:   The following treatment plan was discussed  1. Diabetes mellitus type 2, uncontrolled, with complications (HCC)   improving although not yet at goal, he will switch to Ozempic, we may eventually be able to discontinue Jardiance.  Repeat labs in 3 months.  Encouraged to continue with weight loss efforts  Semaglutide (OZEMPIC) 0.25 or 0.5 MG/DOSE Solution Pen-injector    Lipid Profile    Comp Metabolic Panel    HEMOGLOBIN A1C    Diabetic Monofilament LE Exam   2. Essential hypertension   stable, continue current medication   3. Diabetic retinopathy of both eyes without macular edema associated with type 2 diabetes " mellitus, unspecified retinopathy severity (HCC)   patient to schedule eye exam   4. Chronic right shoulder pain   long-standing difficulty with the right shoulder which is becoming more problematic, he may wish to pursue referral or evaluation but at this point declines as he will be going out of town for 3 months.  Discussed occasional NSAID use or Tylenol with arthritis   5. Diabetic foot ulcer associated with type 2 diabetes mellitus, unspecified laterality, unspecified part of foot, unspecified ulcer stage (HCC)   foot care discussed, no evidence of infection at this time   6. Hepatitis vaccination status unknown  HEP B SURFACE AB   7. Measles, mumps, rubella (MMR) vaccination status unknown  MEASLES IGG ANTIBODY       Followup: 3 months with labs prior         Please note that this dictation was created using voice recognition software. I have worked with consultants from the vendor as well as technical experts from [employer] to optimize the interface. I have made every reasonable attempt to correct obvious errors, but I expect that there are errors of grammar and possibly content that I did not discover before finalizing the note.

## 2019-05-21 NOTE — ASSESSMENT & PLAN NOTE
Patient reports history of multiple shoulder injuries many years ago, shoulder was dislocated at one point.  His shoulder pain is becoming more of an issue in the last several months, now having discomfort when reaching behind himself. It is also hurting when he lays on his right side at night.  He is not taking any medication for this. Believes that he may need to start the process of looking into what may be wrong with his shoulder.  He is, however, going out of town for the next 3 months and does not want referral at this time.  Range of motion is intact.  No numbness, tingling, weakness in the arm. No joint swelling

## 2019-05-21 NOTE — LETTER
Request for Medical Records    Patient Name: Hansel Burgos    : 1965      Dear Doctor: ISIS LEWIS     The above named patient receives primary care at the [facility] by VIRGINIA Ortiz.  The patient informs us that you are his eye care Provider.    Please fax a copy of the most recent eye exam to [phone] or answer the  questions below and fax this sheet back to us at the above number.  Attached is a signed Release of Information.      Date of last eye exam: _____________    Retinal eye exam summary:        Please select the choice(s) that apply.    ____ No diabetic retinopathy    ____    Diabetic retinopathy present      Printed Name and Credentials: __________________________________    Signature of Eye Care Provider: _________________________________    We appreciate your assistance and collaboration in providing efficient patient care!    Kindest Regards,    [facility]  [address]  [place] [id number] [phone]

## 2019-05-21 NOTE — LETTER
[facility]  LEOPOLDO Ortiz.  [address]  [place] [address]  Fax: [phone]   Authorization for Release/Disclosure of   Protected Health Information   Name: KARLA MCKEON : 1965 SSN: [id number]   Address: [address] [place] [address] [place] [address] Phone:    [phone] (home) [phone] (work)   I authorize the entity listed below to release/disclose the PHI below to:   [facility]/VIRGINIA Ortiz and VIRGINIA Ortiz   Provider or Entity Name:  ISIS LEWIS    Address   City, State, [facility]   Phone:      Fax:  [phone]   Reason for request: continuity of care   Information to be released:    [  ] LAST COLONOSCOPY,  including any PATH REPORT and follow-up  [  ] LAST FIT/COLOGUARD RESULT [  ] LAST DEXA  [  ] LAST MAMMOGRAM  [  ] LAST PAP  [  ] LAST LABS [x ] RETINA EXAM REPORT  [  ] IMMUNIZATION RECORDS  [  ] Release all info      [  ] Check here and initial the line next to each item to release ALL health information INCLUDING  _____ Care and treatment for drug and / or alcohol abuse  _____ HIV testing, infection status, or AIDS  _____ Genetic Testing    DATES OF SERVICE OR TIME PERIOD TO BE DISCLOSED: _____________  I understand and acknowledge that:  * This Authorization may be revoked at any time by you in writing, except if your health information has already been used or disclosed.  * Your health information that will be used or disclosed as a result of you signing this authorization could be re-disclosed by the recipient. If this occurs, your re-disclosed health information may no longer be protected by State or Federal laws.  * You may refuse to sign this Authorization. Your refusal will not affect your ability to obtain treatment.  * This Authorization becomes effective upon signing and will  on (date) __________.      If no date is indicated, this Authorization will  one (1) year from the signature date.    Name: Karla  Nba Burgos    Signature:   Date:     5/21/2019       PLEASE FAX REQUESTED RECORDS BACK TO: [phone]

## 2019-06-08 ENCOUNTER — HOSPITAL ENCOUNTER (INPATIENT)
Facility: MEDICAL CENTER | Age: 54
LOS: 3 days | DRG: 042 | End: 2019-06-11
Attending: EMERGENCY MEDICINE | Admitting: INTERNAL MEDICINE
Payer: COMMERCIAL

## 2019-06-08 DIAGNOSIS — E11.628 DIABETIC INFECTION OF LEFT FOOT (HCC): ICD-10-CM

## 2019-06-08 DIAGNOSIS — L08.9 DIABETIC INFECTION OF LEFT FOOT (HCC): ICD-10-CM

## 2019-06-08 DIAGNOSIS — E11.319 DIABETIC RETINOPATHY OF BOTH EYES WITHOUT MACULAR EDEMA ASSOCIATED WITH TYPE 2 DIABETES MELLITUS, UNSPECIFIED RETINOPATHY SEVERITY (HCC): ICD-10-CM

## 2019-06-08 DIAGNOSIS — M86.172 OTHER ACUTE OSTEOMYELITIS OF LEFT FOOT (HCC): ICD-10-CM

## 2019-06-08 PROCEDURE — 99285 EMERGENCY DEPT VISIT HI MDM: CPT

## 2019-06-08 PROCEDURE — 87040 BLOOD CULTURE FOR BACTERIA: CPT

## 2019-06-08 PROCEDURE — 84134 ASSAY OF PREALBUMIN: CPT

## 2019-06-08 PROCEDURE — 83036 HEMOGLOBIN GLYCOSYLATED A1C: CPT

## 2019-06-08 PROCEDURE — 80053 COMPREHEN METABOLIC PANEL: CPT

## 2019-06-08 PROCEDURE — 85025 COMPLETE CBC W/AUTO DIFF WBC: CPT

## 2019-06-08 PROCEDURE — 36415 COLL VENOUS BLD VENIPUNCTURE: CPT

## 2019-06-08 PROCEDURE — 99223 1ST HOSP IP/OBS HIGH 75: CPT | Performed by: INTERNAL MEDICINE

## 2019-06-08 PROCEDURE — 770006 HCHG ROOM/CARE - MED/SURG/GYN SEMI*

## 2019-06-08 PROCEDURE — 86140 C-REACTIVE PROTEIN: CPT

## 2019-06-08 ASSESSMENT — LIFESTYLE VARIABLES: DO YOU DRINK ALCOHOL: NO

## 2019-06-09 ENCOUNTER — APPOINTMENT (OUTPATIENT)
Dept: RADIOLOGY | Facility: MEDICAL CENTER | Age: 54
DRG: 042 | End: 2019-06-09
Attending: INTERNAL MEDICINE
Payer: COMMERCIAL

## 2019-06-09 ENCOUNTER — HOSPITAL ENCOUNTER (OUTPATIENT)
Dept: RADIOLOGY | Facility: MEDICAL CENTER | Age: 54
End: 2019-06-09

## 2019-06-09 PROBLEM — E11.628 DIABETIC INFECTION OF LEFT FOOT (HCC): Status: ACTIVE | Noted: 2019-06-09

## 2019-06-09 PROBLEM — L08.9 DIABETIC INFECTION OF LEFT FOOT (HCC): Status: ACTIVE | Noted: 2019-06-09

## 2019-06-09 LAB
ALBUMIN SERPL BCP-MCNC: 4.2 G/DL (ref 3.2–4.9)
ALBUMIN/GLOB SERPL: 1.4 G/DL
ALP SERPL-CCNC: 101 U/L (ref 30–99)
ALT SERPL-CCNC: 28 U/L (ref 2–50)
ANION GAP SERPL CALC-SCNC: 11 MMOL/L (ref 0–11.9)
AST SERPL-CCNC: 22 U/L (ref 12–45)
BASOPHILS # BLD AUTO: 0.7 % (ref 0–1.8)
BASOPHILS # BLD: 0.06 K/UL (ref 0–0.12)
BILIRUB SERPL-MCNC: 1.1 MG/DL (ref 0.1–1.5)
BUN SERPL-MCNC: 20 MG/DL (ref 8–22)
CALCIUM SERPL-MCNC: 9.1 MG/DL (ref 8.5–10.5)
CHLORIDE SERPL-SCNC: 110 MMOL/L (ref 96–112)
CO2 SERPL-SCNC: 17 MMOL/L (ref 20–33)
CREAT SERPL-MCNC: 1.17 MG/DL (ref 0.5–1.4)
CRP SERPL HS-MCNC: 0.1 MG/DL (ref 0–0.75)
CRP SERPL HS-MCNC: 0.15 MG/DL (ref 0–0.75)
EOSINOPHIL # BLD AUTO: 0.26 K/UL (ref 0–0.51)
EOSINOPHIL NFR BLD: 3 % (ref 0–6.9)
ERYTHROCYTE [DISTWIDTH] IN BLOOD BY AUTOMATED COUNT: 37.2 FL (ref 35.9–50)
ERYTHROCYTE [SEDIMENTATION RATE] IN BLOOD BY WESTERGREN METHOD: 1 MM/HOUR (ref 0–20)
EST. AVERAGE GLUCOSE BLD GHB EST-MCNC: 160 MG/DL
GLOBULIN SER CALC-MCNC: 3 G/DL (ref 1.9–3.5)
GLUCOSE BLD-MCNC: 103 MG/DL (ref 65–99)
GLUCOSE BLD-MCNC: 115 MG/DL (ref 65–99)
GLUCOSE BLD-MCNC: 145 MG/DL (ref 65–99)
GLUCOSE BLD-MCNC: 98 MG/DL (ref 65–99)
GLUCOSE BLD-MCNC: 99 MG/DL (ref 65–99)
GLUCOSE SERPL-MCNC: 114 MG/DL (ref 65–99)
GRAM STN SPEC: NORMAL
HBA1C MFR BLD: 7.2 % (ref 0–5.6)
HCT VFR BLD AUTO: 45.9 % (ref 42–52)
HGB BLD-MCNC: 15.3 G/DL (ref 14–18)
IMM GRANULOCYTES # BLD AUTO: 0.03 K/UL (ref 0–0.11)
IMM GRANULOCYTES NFR BLD AUTO: 0.3 % (ref 0–0.9)
LYMPHOCYTES # BLD AUTO: 2.02 K/UL (ref 1–4.8)
LYMPHOCYTES NFR BLD: 23.3 % (ref 22–41)
MCH RBC QN AUTO: 28.2 PG (ref 27–33)
MCHC RBC AUTO-ENTMCNC: 33.3 G/DL (ref 33.7–35.3)
MCV RBC AUTO: 84.5 FL (ref 81.4–97.8)
MONOCYTES # BLD AUTO: 0.68 K/UL (ref 0–0.85)
MONOCYTES NFR BLD AUTO: 7.8 % (ref 0–13.4)
NEUTROPHILS # BLD AUTO: 5.63 K/UL (ref 1.82–7.42)
NEUTROPHILS NFR BLD: 64.9 % (ref 44–72)
NRBC # BLD AUTO: 0 K/UL
NRBC BLD-RTO: 0 /100 WBC
PLATELET # BLD AUTO: 193 K/UL (ref 164–446)
PMV BLD AUTO: 9.9 FL (ref 9–12.9)
POTASSIUM SERPL-SCNC: 4 MMOL/L (ref 3.6–5.5)
PREALB SERPL-MCNC: 23 MG/DL (ref 18–38)
PROT SERPL-MCNC: 7.2 G/DL (ref 6–8.2)
RBC # BLD AUTO: 5.43 M/UL (ref 4.7–6.1)
SIGNIFICANT IND 70042: NORMAL
SITE SITE: NORMAL
SODIUM SERPL-SCNC: 138 MMOL/L (ref 135–145)
SOURCE SOURCE: NORMAL
WBC # BLD AUTO: 8.7 K/UL (ref 4.8–10.8)

## 2019-06-09 PROCEDURE — 87186 SC STD MICRODIL/AGAR DIL: CPT

## 2019-06-09 PROCEDURE — 770006 HCHG ROOM/CARE - MED/SURG/GYN SEMI*

## 2019-06-09 PROCEDURE — 700111 HCHG RX REV CODE 636 W/ 250 OVERRIDE (IP): Performed by: EMERGENCY MEDICINE

## 2019-06-09 PROCEDURE — 87070 CULTURE OTHR SPECIMN AEROBIC: CPT

## 2019-06-09 PROCEDURE — 700105 HCHG RX REV CODE 258: Performed by: EMERGENCY MEDICINE

## 2019-06-09 PROCEDURE — 700105 HCHG RX REV CODE 258: Performed by: INTERNAL MEDICINE

## 2019-06-09 PROCEDURE — 87077 CULTURE AEROBIC IDENTIFY: CPT

## 2019-06-09 PROCEDURE — 82962 GLUCOSE BLOOD TEST: CPT | Mod: 91

## 2019-06-09 PROCEDURE — 86140 C-REACTIVE PROTEIN: CPT

## 2019-06-09 PROCEDURE — 96366 THER/PROPH/DIAG IV INF ADDON: CPT

## 2019-06-09 PROCEDURE — 96367 TX/PROPH/DG ADDL SEQ IV INF: CPT

## 2019-06-09 PROCEDURE — 96365 THER/PROPH/DIAG IV INF INIT: CPT

## 2019-06-09 PROCEDURE — 87205 SMEAR GRAM STAIN: CPT

## 2019-06-09 PROCEDURE — 99233 SBSQ HOSP IP/OBS HIGH 50: CPT | Performed by: INTERNAL MEDICINE

## 2019-06-09 PROCEDURE — 73718 MRI LOWER EXTREMITY W/O DYE: CPT | Mod: LT

## 2019-06-09 PROCEDURE — 700111 HCHG RX REV CODE 636 W/ 250 OVERRIDE (IP): Performed by: INTERNAL MEDICINE

## 2019-06-09 PROCEDURE — 700102 HCHG RX REV CODE 250 W/ 637 OVERRIDE(OP): Performed by: INTERNAL MEDICINE

## 2019-06-09 PROCEDURE — 85652 RBC SED RATE AUTOMATED: CPT

## 2019-06-09 RX ORDER — ATORVASTATIN CALCIUM 40 MG/1
40 TABLET, FILM COATED ORAL EVERY MORNING
COMMUNITY

## 2019-06-09 RX ORDER — IBUPROFEN 200 MG
800 TABLET ORAL 2 TIMES DAILY PRN
COMMUNITY

## 2019-06-09 RX ORDER — HEPARIN SODIUM 5000 [USP'U]/ML
5000 INJECTION, SOLUTION INTRAVENOUS; SUBCUTANEOUS EVERY 8 HOURS
Status: COMPLETED | OUTPATIENT
Start: 2019-06-09 | End: 2019-06-09

## 2019-06-09 RX ORDER — POLYETHYLENE GLYCOL 3350 17 G/17G
1 POWDER, FOR SOLUTION ORAL
Status: DISCONTINUED | OUTPATIENT
Start: 2019-06-09 | End: 2019-06-11 | Stop reason: HOSPADM

## 2019-06-09 RX ORDER — CEPHALEXIN 500 MG/1
500 CAPSULE ORAL 4 TIMES DAILY
Status: ON HOLD | COMMUNITY
Start: 2019-06-05 | End: 2019-06-11

## 2019-06-09 RX ORDER — ACETAMINOPHEN 325 MG/1
650 TABLET ORAL EVERY 6 HOURS PRN
Status: DISCONTINUED | OUTPATIENT
Start: 2019-06-09 | End: 2019-06-11 | Stop reason: HOSPADM

## 2019-06-09 RX ORDER — LISINOPRIL 10 MG/1
10 TABLET ORAL DAILY
Status: DISCONTINUED | OUTPATIENT
Start: 2019-06-09 | End: 2019-06-11 | Stop reason: HOSPADM

## 2019-06-09 RX ORDER — LISINOPRIL 10 MG/1
10 TABLET ORAL DAILY
COMMUNITY

## 2019-06-09 RX ORDER — SODIUM CHLORIDE, SODIUM LACTATE, POTASSIUM CHLORIDE, CALCIUM CHLORIDE 600; 310; 30; 20 MG/100ML; MG/100ML; MG/100ML; MG/100ML
INJECTION, SOLUTION INTRAVENOUS CONTINUOUS
Status: DISCONTINUED | OUTPATIENT
Start: 2019-06-09 | End: 2019-06-11

## 2019-06-09 RX ORDER — AMOXICILLIN 250 MG
2 CAPSULE ORAL 2 TIMES DAILY
Status: DISCONTINUED | OUTPATIENT
Start: 2019-06-09 | End: 2019-06-11 | Stop reason: HOSPADM

## 2019-06-09 RX ORDER — BISACODYL 10 MG
10 SUPPOSITORY, RECTAL RECTAL
Status: DISCONTINUED | OUTPATIENT
Start: 2019-06-09 | End: 2019-06-11 | Stop reason: HOSPADM

## 2019-06-09 RX ORDER — ATORVASTATIN CALCIUM 40 MG/1
40 TABLET, FILM COATED ORAL EVERY MORNING
Status: DISCONTINUED | OUTPATIENT
Start: 2019-06-09 | End: 2019-06-11 | Stop reason: HOSPADM

## 2019-06-09 RX ADMIN — HEPARIN SODIUM 5000 UNITS: 5000 INJECTION, SOLUTION INTRAVENOUS; SUBCUTANEOUS at 21:24

## 2019-06-09 RX ADMIN — VANCOMYCIN HYDROCHLORIDE 2000 MG: 100 INJECTION, POWDER, LYOPHILIZED, FOR SOLUTION INTRAVENOUS at 12:33

## 2019-06-09 RX ADMIN — HEPARIN SODIUM 5000 UNITS: 5000 INJECTION, SOLUTION INTRAVENOUS; SUBCUTANEOUS at 14:09

## 2019-06-09 RX ADMIN — SODIUM CHLORIDE, POTASSIUM CHLORIDE, SODIUM LACTATE AND CALCIUM CHLORIDE: 600; 310; 30; 20 INJECTION, SOLUTION INTRAVENOUS at 12:33

## 2019-06-09 RX ADMIN — VANCOMYCIN HYDROCHLORIDE 3000 MG: 100 INJECTION, POWDER, LYOPHILIZED, FOR SOLUTION INTRAVENOUS at 00:32

## 2019-06-09 RX ADMIN — CEFTRIAXONE SODIUM 1 G: 1 INJECTION, POWDER, FOR SOLUTION INTRAMUSCULAR; INTRAVENOUS at 00:03

## 2019-06-09 RX ADMIN — SODIUM CHLORIDE, POTASSIUM CHLORIDE, SODIUM LACTATE AND CALCIUM CHLORIDE: 600; 310; 30; 20 INJECTION, SOLUTION INTRAVENOUS at 03:00

## 2019-06-09 ASSESSMENT — COPD QUESTIONNAIRES
DO YOU EVER COUGH UP ANY MUCUS OR PHLEGM?: NO/ONLY WITH OCCASIONAL COLDS OR INFECTIONS
HAVE YOU SMOKED AT LEAST 100 CIGARETTES IN YOUR ENTIRE LIFE: NO/DON'T KNOW
COPD SCREENING SCORE: 1
DURING THE PAST 4 WEEKS HOW MUCH DID YOU FEEL SHORT OF BREATH: NONE/LITTLE OF THE TIME

## 2019-06-09 ASSESSMENT — ENCOUNTER SYMPTOMS
SORE THROAT: 0
BACK PAIN: 0
FLANK PAIN: 0
HEADACHES: 0
DIAPHORESIS: 0
WEAKNESS: 0
VOMITING: 0
CONSTITUTIONAL NEGATIVE: 1
SENSORY CHANGE: 1
COUGH: 0
FEVER: 0
BLOOD IN STOOL: 0
SHORTNESS OF BREATH: 0
MYALGIAS: 0
CHILLS: 0
DIARRHEA: 0
SPUTUM PRODUCTION: 0
ABDOMINAL PAIN: 0
FOCAL WEAKNESS: 0
NAUSEA: 0
BRUISES/BLEEDS EASILY: 0
NECK PAIN: 0
PALPITATIONS: 0
DIZZINESS: 0
WHEEZING: 0
BLURRED VISION: 0
SEIZURES: 0

## 2019-06-09 ASSESSMENT — COGNITIVE AND FUNCTIONAL STATUS - GENERAL
MOBILITY SCORE: 24
DAILY ACTIVITIY SCORE: 24
SUGGESTED CMS G CODE MODIFIER MOBILITY: CH
SUGGESTED CMS G CODE MODIFIER DAILY ACTIVITY: CH

## 2019-06-09 ASSESSMENT — PATIENT HEALTH QUESTIONNAIRE - PHQ9
1. LITTLE INTEREST OR PLEASURE IN DOING THINGS: NOT AT ALL
2. FEELING DOWN, DEPRESSED, IRRITABLE, OR HOPELESS: NOT AT ALL
SUM OF ALL RESPONSES TO PHQ9 QUESTIONS 1 AND 2: 0

## 2019-06-09 ASSESSMENT — LIFESTYLE VARIABLES
EVER_SMOKED: NEVER
ALCOHOL_USE: NO
EVER_SMOKED: NEVER

## 2019-06-09 NOTE — ASSESSMENT & PLAN NOTE
Start on insulin sliding scale with serial Accu-Checks, sugars remain well controlled  Check hemoglobin A1c  Hypoglycemic protocol in place

## 2019-06-09 NOTE — H&P
Hospital Medicine History & Physical Note    Date of Service  6/8/2019    Primary Care Physician  LEOPOLDO Ortiz.    Consultants  Limb preservation service    Code Status  Full code    Chief Complaint  Left foot wound    History of Presenting Illness  54 y.o. male with a past medical history of type 2 diabetes mellitus complicated by polyneuropathy, hyperlipidemia, hypertension who presented 6/8/2019 with left foot wound infection.  The patient first noted mild swelling and redness of his left fifth toe 4 weeks ago.  He tried taking Keflex with some improvement however his symptoms worsened.  He presented to an ER in [place] yesterday where an x-ray was performed that revealed erosive changes of the fifth MTP joint which may represent osteomyelitis, recommended MRI for further evaluation.  He was treated with IV ceftriaxone and IV vancomycin and was recommended admission however the patient declined.  Today while his wife was cleaning his wound she was and exposed a much deeper wound therefore the patient decided to come into the ER again.  He denies any fevers, chills, shortness of breath.    Review of Systems  Review of Systems   Constitutional: Negative for chills, diaphoresis and fever.   HENT: Negative for hearing loss and sore throat.    Eyes: Negative for blurred vision.   Respiratory: Negative for cough, sputum production, shortness of breath and wheezing.    Cardiovascular: Negative for chest pain, palpitations and leg swelling.   Gastrointestinal: Negative for abdominal pain, blood in stool, diarrhea, nausea and vomiting.   Genitourinary: Negative for dysuria, flank pain and urgency.   Musculoskeletal: Negative for back pain, joint pain, myalgias and neck pain.   Skin: Negative for rash.        Left fifth toe ulcer   Neurological: Negative for dizziness, focal weakness, seizures and headaches.   Endo/Heme/Allergies: Does not bruise/bleed easily.   Psychiatric/Behavioral: Negative for suicidal  ideas.   All other systems reviewed and are negative.      Past Medical History   has a past medical history of Arthritis; Diabetes (HCC); High cholesterol; Hyperlipidemia; Hypertension; and Pain.    Surgical History   has a past surgical history that includes other orthopedic surgery (Right, 2000); knee arthroscopy (Left, 10/24/2016); medial meniscectomy (Left, 10/24/2016); and bunionectomy (Left, 5/1/2018).     Family History  family history includes Alcohol/Drug in his father; Cancer in his father.     Social History   reports that he has never smoked. He has never used smokeless tobacco. He reports that he drinks alcohol. He reports that he does not use drugs.    Allergies  Allergies   Allergen Reactions   • Pcn [Penicillins]      Had as a child       Medications  Prior to Admission Medications   Prescriptions Last Dose Informant Patient Reported? Taking?   Empagliflozin (JARDIANCE) 10 MG Tab   No No   Sig: Take 10 mg by mouth every morning before breakfast.   ONE TOUCH ULTRA TEST strip   No No   Sig: USE ONE STRIP FOR BLOOD SUGAR TEST THREE TIMES DAILY   Semaglutide (OZEMPIC) 0.25 or 0.5 MG/DOSE Solution Pen-injector   No No   Sig: Inject 0.5 mg as instructed every 7 days.   aspirin (ASA) 325 MG Tab   Yes No   Sig: Take 325 mg by mouth every day.   atorvastatin (LIPITOR) 40 MG Tab   No No   Sig: TAKE 1 TABLET BY MOUTH EVERY DAY   lisinopril (PRINIVIL) 10 MG Tab   No No   Sig: TAKE 1 TABLET BY MOUTH EVERY DAY   metformin (GLUCOPHAGE) 1000 MG tablet   No No   Sig: TAKE 1 TABLET BY MOUTH TWICE DAILY WITH MEALS      Facility-Administered Medications: None       Physical Exam  Temp:  [36.2 °C (97.1 °F)] 36.2 °C (97.1 °F)  Pulse:  [90] 90  Resp:  [18] 18  BP: (120)/(81) 120/81  SpO2:  [97 %] 97 %    Physical Exam   Constitutional: He is oriented to person, place, and time. He appears well-developed and well-nourished. No distress.   HENT:   Head: Normocephalic and atraumatic.   Mouth/Throat: Oropharynx is clear and  moist.   Eyes: Pupils are equal, round, and reactive to light. Conjunctivae are normal. No scleral icterus.   Neck: Normal range of motion. Neck supple.   Cardiovascular: Normal rate, regular rhythm and normal heart sounds.    Pulmonary/Chest: Effort normal and breath sounds normal. No respiratory distress. He has no wheezes. He has no rales.   Abdominal: Soft. Bowel sounds are normal. He exhibits no distension. There is no tenderness. There is no rebound.   Musculoskeletal: Normal range of motion. He exhibits no edema or tenderness.   Lymphadenopathy:     He has no cervical adenopathy.   Neurological: He is alert and oriented to person, place, and time. No cranial nerve deficit. Coordination normal.   Skin:   Left fifth toe with ulceration on lateral aspect with surrounding erythema   Psychiatric: He has a normal mood and affect. His behavior is normal.   Nursing note and vitals reviewed.      Laboratory:          No results for input(s): ALTSGPT, ASTSGOT, ALKPHOSPHAT, TBILIRUBIN, DBILIRUBIN, GAMMAGT, AMYLASE, LIPASE, ALB, PREALBUMIN, GLUCOSE in the last 72 hours.              No results for input(s): TROPONINI in the last 72 hours.    Urinalysis:    No results found     Imaging:  OUTSIDE IMAGES-DX LOWER EXTREMITY, LEFT   Final Result      MR-FOOT-W/O LEFT    (Results Pending)         Assessment/Plan:  I anticipate this patient will require at least two midnights for appropriate medical management, necessitating inpatient admission.    Diabetic infection of left foot (HCC)- (present on admission)   Assessment & Plan    I have started the patient on IV ceftriaxone and IV vancomycin.  Monitor for vancomycin toxicity and follow trough levels  Follow blood and wound culture  Check ESR, CRP and prealbumin  I will order an MRI left foot for further evaluation of underlying osteomyelitis  Limb preservation service and wound care have been consulted       Hyperlipidemia- (present on admission)   Assessment & Plan     Continue Lipitor     Essential hypertension- (present on admission)   Assessment & Plan    Continue hypertension     Diabetes mellitus type 2, uncontrolled, with complications (HCC)- (present on admission)   Assessment & Plan    Start on insulin sliding scale with serial Accu-Checks  Check hemoglobin A1c  Hypoglycemic protocol in place             VTE prophylaxis: scd

## 2019-06-09 NOTE — ED TRIAGE NOTES
Hansel Burgos  54 y.o.  Chief Complaint   Patient presents with   • Wound Infection     injured right 5th toe a month ago, has developed diabetic ulcer with swelling and redness, xray in [facility] showed possible osteomyelitis   • Sent by MD     seen in a [facility] ED yesterday, was supposed to be admitted but was down there for vacation  decided to come home and be admitted      Patient ambulatory with steady gait to triage room with above complaint.  Patient appears in no distress, has large swelling and redness to right fifth toe.  Ulceration 1-2 cm deep with yellow in the wound bed, notes yellow drainage.  Patient brought xray disk with him from [facility].  Denies any fevers.    Received 2G IV vanco and 1G IV rocephin yesterday in ED in [facility].    Patient escorted to the lobby and instructed to notify staff of any changes in condition.

## 2019-06-09 NOTE — ASSESSMENT & PLAN NOTE
I have started the patient on IV ceftriaxone and IV vancomycin.  Monitor for vancomycin toxicity and follow trough levels, check daily trough, goal levels in 15-20  Follow blood and wound culture-pending  ESR, CRP elevated  -MRI of the Foot shows likely OM  Limb preservation service and wound care have been consulted  -going for 5th toe amputation

## 2019-06-09 NOTE — PROGRESS NOTES
Hospital Medicine Daily Progress Note    Date of Service  6/9/2019    Chief Complaint  54 y.o. male admitted 6/8/2019 with Left 5th Diabetic foot infection    Hospital Course    See below    Interval Problem Update  Diabetic foot infection-recently seen in [place]. MRI pending. Been trying to control his sugars and do appropriate wound care. They live in a 5th wheel and travel a lot.     Consultants/Specialty  LPS    Code Status  fcfc    Disposition  ORTHO    Review of Systems  Review of Systems   Constitutional: Negative.  Negative for chills and fever.   Gastrointestinal: Negative for abdominal pain, nausea and vomiting.   Genitourinary: Negative for dysuria and hematuria.   Musculoskeletal: Positive for joint pain (mild). Negative for myalgias.   Neurological: Positive for sensory change (chronic neuropathy). Negative for weakness.   All other systems reviewed and are negative.       Physical Exam  Temp:  [36.2 °C (97.1 °F)] 36.2 °C (97.1 °F)  Pulse:  [63-92] 80  Resp:  [18] 18  BP: (120-161)/(74-88) 132/76  SpO2:  [94 %-97 %] 94 %    Physical Exam   Constitutional: He is oriented to person, place, and time. He appears well-developed and well-nourished. No distress.   HENT:   Head: Normocephalic and atraumatic.   Eyes: Pupils are equal, round, and reactive to light. No scleral icterus.   Neck: Normal range of motion. Neck supple.   Cardiovascular: Normal rate, regular rhythm, normal heart sounds and intact distal pulses.    No murmur heard.  Pulmonary/Chest: Effort normal and breath sounds normal. No stridor. No respiratory distress.   Abdominal: Soft. Bowel sounds are normal. There is no tenderness.   Musculoskeletal: Normal range of motion. He exhibits tenderness.   Small ulcer on the lateral aspect of the 5th toe of the L foot, minimal surrounding erythema, mild TTP   Neurological: He is alert and oriented to person, place, and time. No cranial nerve deficit.   Skin: Skin is warm and dry. No rash noted.    Psychiatric: He has a normal mood and affect.   Nursing note and vitals reviewed.      Fluids    Intake/Output Summary (Last 24 hours) at 06/09/19 1152  Last data filed at 06/09/19 0418   Gross per 24 hour   Intake              500 ml   Output                0 ml   Net              500 ml       Laboratory  Recent Labs      06/08/19   2304   WBC  8.7   RBC  5.43   HEMOGLOBIN  15.3   HEMATOCRIT  45.9   MCV  84.5   MCH  28.2   MCHC  33.3*   RDW  37.2   PLATELETCT  193   MPV  9.9     Recent Labs      06/08/19   2304   SODIUM  138   POTASSIUM  4.0   CHLORIDE  110   CO2  17*   GLUCOSE  114*   BUN  20   CREATININE  1.17   CALCIUM  9.1                   Imaging  MR-FOOT-W/O LEFT   Final Result      1.  Marrow edema and cartilage loss with subchondral cystic change and osteophytic spurring involving the first, second and third MTP joints. There is also surrounding soft tissue prominence likely representing cellulitis with minimal joint fluid.    Considering the presence of arthropathy, this is unlikely to represent osteomyelitis and most likely represents presence of osteoarthritis versus inflammatory or crystal-induced arthropathy.      2.  Marrow edema involving the fifth proximal, middle and distal phalanges with surrounding soft tissue swelling. There is relative sparing of the fifth metatarsal head. Considering the absence of significant arthropathy involving this joint, these    findings are worrisome for osteomyelitis.      3.  Soft tissue swelling involving the dorsum of the foot likely representing cellulitis.      4.  Prominent osteoarthritis of the tarsal metatarsal joints and the articulations of the midfoot.      OUTSIDE IMAGES-DX LOWER EXTREMITY, LEFT   Final Result      MR-FOOT-WITH LEFT    (Results Pending)        Assessment/Plan  Diabetic infection of left foot (HCC)- (present on admission)   Assessment & Plan    I have started the patient on IV ceftriaxone and IV vancomycin.  Monitor for vancomycin  toxicity and follow trough levels, check daily trough, goal levels in 15-20  Follow blood and wound culture  Check ESR, CRP and prealbumin-pending  -MRI of the Foot shows likely OM  Limb preservation service and wound care have been consulted       Hyperlipidemia- (present on admission)   Assessment & Plan    Continue Lipitor     Essential hypertension- (present on admission)   Assessment & Plan    Continue lisinopril 10 mg daily, monitor     Diabetes mellitus type 2, uncontrolled, with complications (HCC)- (present on admission)   Assessment & Plan    Start on insulin sliding scale with serial Accu-Checks  Check hemoglobin A1c  Hypoglycemic protocol in place              VTE prophylaxis: heparin

## 2019-06-09 NOTE — ED NOTES
Pt ambulated to room with steady gait.  Pt has all documents from ED visit in [place].  Received 2 gm vancomycin, 1 gm Ceftriaxone last night IV in [place] ED.  He had been taking Keflex po over the last week for possible infection.    Maceration and pus to wound on lateral side of L fifth toe

## 2019-06-09 NOTE — PROGRESS NOTES
Patient arrived to floor in bed.   Patient denies pain at this time.   Patient oriented to call light system.

## 2019-06-09 NOTE — ED PROVIDER NOTES
ED Provider Note        History obtained from: Patient, medical records    CHIEF COMPLAINT  Chief Complaint   Patient presents with   • Wound Infection     injured right 5th toe a month ago, has developed diabetic ulcer with swelling and redness, xray in [facility] showed possible osteomyelitis   • Sent by MD     seen in a [facility] ED yesterday, was supposed to be admitted but was down there for vacation  decided to come home and be admitted        [facility]  Hansel Burgos is a 54 y.o. male who presents with concern for osteomyelitis.  Patient reports that he has had a wound on his left fifth toe for some time, however it has been having worsening redness.  He tried Keflex, which is somewhat improved the redness, however was seen in emergency department last night given abnormalities on exam from wound cleaning, with an x-ray that is concerning for possible osteomyelitis.  He received a dose of IV vancomycin and ceftriaxone was r recommended for admission, however the patient declined.  Patient is presenting today for admission and evaluation.  He denies fevers, chills, lightheadedness.  He does have a history of diabetes and has diabetic neuropathy.  His significant other states that when she was cleaning the wound the other day, she uncover the skin, noted some hard calcifications that came free and then the wound appeared much deeper than prior, however she could not see the bone.    REVIEW OF SYSTEMS    CONSTITUTIONAL:  No fever.  CARDIOVASCULAR:  No chest discomfort.  RESPIRATORY:  No pleuritic chest pain.  GASTROINTESTINAL:  No abdominal pain.    See HPI for further details.       PAST MEDICAL HISTORY  Past Medical History:   Diagnosis Date   • Arthritis     Kenn feet   • Diabetes (HCC)    • High cholesterol    • Hyperlipidemia    • Hypertension    • Pain     knee        FAMILY HISTORY  Family History   Problem Relation Age of Onset   • Cancer Father         pancreatic   • Alcohol/Drug Father        SOCIAL  "HISTORY   reports that he has never smoked. He has never used smokeless tobacco. He reports that he drinks alcohol. He reports that he does not use drugs.    SURGICAL HISTORY  Past Surgical History:   Procedure Laterality Date   • BUNIONECTOMY Left 5/1/2018    Procedure: BUNIONECTOMY - VILLAGOMEZ;  Surgeon: Jono Shipman D.P.M.;  Location: [facility];  Service: Podiatry   • KNEE ARTHROSCOPY Left 10/24/2016    Procedure: KNEE ARTHROSCOPY;  Surgeon: Sinan Truong M.D.;  Location: [facility];  Service:    • MEDIAL MENISCECTOMY Left 10/24/2016    Procedure: MEDIAL MENISCECTOMY - PARTIAL; DEBRIDEMENT; CHONDROPLASTY;  Surgeon: Sinan Truong M.D.;  Location: [facility];  Service:    • OTHER ORTHOPEDIC SURGERY Right 2000    tendon repair       CURRENT MEDICATIONS  Home Medications     Reviewed by Naya Beverly (Pharmacy Tech) on 06/09/19 at 0019  Med List Status: Complete   Medication Last Dose Status   aspirin (ASA) 325 MG Tab <week Active   atorvastatin (LIPITOR) 40 MG Tab 6/8/2019 Active   cephALEXin (KEFLEX) 500 MG Cap 6/7/2019 Active   Empagliflozin (JARDIANCE) 10 MG Tab 6/8/2019 Active   ibuprofen (MOTRIN) 200 MG Tab 6/9/2019 Active   lisinopril (PRINIVIL) 10 MG Tab 6/8/2019 Active   metformin (GLUCOPHAGE) 1000 MG tablet 6/8/2019 Active   Semaglutide (OZEMPIC) 0.25 or 0.5 MG/DOSE Solution Pen-injector 6/3/2019 Active                ALLERGIES  Allergies   Allergen Reactions   • Pcn [Penicillins]      Had as a child       PHYSICAL EXAM  VITAL SIGNS: /81   Pulse 92   Temp 36.2 °C (97.1 °F) (Temporal)   Resp 18   Ht 2.057 m (6' 9\")   Wt (!) 139.5 kg (307 lb 8.7 oz)   SpO2 96%   BMI 32.96 kg/m²    Gen: alert, no acute distress  HENT: ATNC  Eyes: normal conjuctiva  Resp: No resipiratory distress.   CV: No JVD, regular rate and  Abd: Non-distended, nontender  Extremities: No deformity, surgical scar over left first MCP.  Left fifth MCP has lateral " "ulceration with erythema.  Patient has diminished sensation of the foot.  No streaking redness proximally.          RADIOLOGY/PROCEDURES  OUTSIDE IMAGES-DX LOWER EXTREMITY, LEFT   Final Result      MR-FOOT-W/O LEFT    (Results Pending)         LABS  Labs Reviewed   CBC WITH DIFFERENTIAL - Abnormal; Notable for the following:        Result Value    MCHC 33.3 (*)     All other components within normal limits   COMP METABOLIC PANEL - Abnormal; Notable for the following:     Co2 17 (*)     Glucose 114 (*)     Alkaline Phosphatase 101 (*)     All other components within normal limits   BLOOD CULTURE    Narrative:     Per Hospital Policy: Only change Specimen Src: to \"Line\" if  specified by physician order.   BLOOD CULTURE    Narrative:     Per Hospital Policy: Only change Specimen Src: to \"Line\" if  specified by physician order.   ESTIMATED GFR   HEMOGLOBIN A1C   CRP QUANTITIVE (NON-CARDIAC)   PREALBUMIN   CULTURE WOUND W/ GRAM STAIN              COURSE & MEDICAL DECISION MAKING  Pertinent Labs & Imaging studies reviewed. (See chart for details)    Patient presents with concern for osteomyelitis.  Patient has x-ray from yesterday with an impression:   1.  Erosive changes on the first, third, and fifth MTP joints, also within the MTT joints the changes in the first and third MTP joints appear chronic, however the fifth MTP joint acute and may represent osteomyelitis if high clinical suspicion for acute osteomyelitis recommend nuclear medicine bone scan or MRI of the foot for further evaluation  2.  No acute fracture or dislocation diffuse soft tissue edema    Patient's wound is concerning for possible osteomyelitis.  Review of his labs at the outside hospital from yesterday demonstrate an elevated creatinine of 1.44.  Patient's prior creatinines here had been 1.28.  Patient will be given additional dose of IV antibiotics, images uploaded, and the case discussed with the hospitalist.    Patient does have low bicarbonate, " however no evidence of diabetic acidosis.    11:57 PM  Case discussed with hospitalist will admit patient.    FINAL IMPRESSION  1. Other acute osteomyelitis of left foot (HCC)

## 2019-06-09 NOTE — CARE PLAN
Problem: Communication  Goal: The ability to communicate needs accurately and effectively will improve  Outcome: PROGRESSING AS EXPECTED  Patient oriented to call light system.

## 2019-06-09 NOTE — PROGRESS NOTES
Pharmacy Kinetics Addendum:    54 y.o. male on vancomycin day # 1 6/9/2019    Currently on Vancomycin 2000 mg iv q12hr (~14 mg/kg)    Indication for Treatment: Diabetic foot infection, r/o osteomyelitis     Recent Labs      06/08/19   2304   BUN  20   CREATININE  1.17   ALBUMIN  4.2     No results for input(s): VANCOTROUGH, VANCOPEAK, VANCORANDOM in the last 72 hours.    A/P   1. Vancomycin dose change: N/A  2. Next vancomycin level: Prior to the 5th dose on 6/11 @0030 (ordered), or sooner if clinically indicated  3. Goal trough: 12-16 mcg/ml - target 16  4. Comments: MRI shows likely osteo. Patient has little risk for accumulation, aside from large size (BMI 32). BMP tomorrow to monitor renal. Trough as above. Pharmacy will follow. Narrow therapy as able.    Yadiel Blakely, PharmD, BCPS

## 2019-06-09 NOTE — WOUND TEAM
"[facility]  Inpatient Services  Initial Wound and Skin Care Evaluation  LPS Evalution    Admission Date: 6/8/2019     Consult Date: 6/9/19   HPI, PMH, SH: Reviewed    Unit where seen by Wound Team: [facility]     WOUND CONSULT RELATED TO:  Evaluation of left 5th toe wound     SUBJECTIVE:  \"This isn't our first rodeo\"      Self Report / Pain Level:  Denies; neuropathic       OBJECTIVE:  Patient and wife very knowledgeable regarding care and DM care.  He is currently a patient of Dr. Shipman but hasn't been seen by him for this wound.  He and his wife travel with  and he reports the wound occurred \"about a month\" ago.  They have been keeping it clean and changing dressing daily with silvadene cream.  They went to ED in  two days ago after wife cleansed wound and \"that fibrous stuff that builds up came out and it was really deep\".      WOUND TYPE, LOCATION, CHARACTERISTICS (Pressure Injuries: location, stage, POA or date identified)     Wound 06/09/19 Neuropathic Toe (Comment which one) Neuropathic ulceration lateral left 5th toe (Active)   Wound Image       6/9/2019 11:30 AM   Site Assessment Clean;Drainage;Brown;Yellow 6/9/2019 11:30 AM   [place]-wound Assessment Clean;Intact;Pink 6/9/2019 11:30 AM   Margins Unattached edges 6/9/2019 11:30 AM   Wound Length (cm) 0.5 cm 6/9/2019 11:30 AM   Wound Width (cm) 1 cm 6/9/2019 11:30 AM   Wound Depth (cm) 1 cm 6/9/2019 11:30 AM   Wound Surface Area (cm^2) 0.5 cm^2 6/9/2019 11:30 AM   Tunneling 0 cm 6/9/2019 11:30 AM   Undermining 0 cm 6/9/2019 11:30 AM   Closure None 6/9/2019 11:30 AM   Drainage Amount Scant 6/9/2019 11:30 AM   Drainage Description Tan 6/9/2019 11:30 AM   Non-staged Wound Description Full thickness 6/9/2019 11:30 AM   Treatments Cleansed;Site care 6/9/2019 11:30 AM   Cleansing Normal Saline Irrigation 6/9/2019 11:30 AM   Periwound Protectant Not Applicable 6/9/2019 11:30 AM   Dressing Options Dry Gauze;Hydrofiber Silver 6/9/2019 11:30 AM "   Dressing Cleansing/Solutions Not Applicable 6/9/2019 11:30 AM   Dressing Changed New 6/9/2019 11:30 AM   Dressing Status Intact 6/9/2019 11:30 AM   Dressing Change Frequency Every 48 hrs 6/9/2019 11:30 AM   NEXT Dressing Change  06/11/19 6/9/2019 11:30 AM   NEXT Weekly Photo (Inpatient Only) 06/16/19 6/9/2019 11:30 AM   WOUND NURSE ONLY - Odor None 6/9/2019 11:30 AM   WOUND NURSE ONLY - Pulses 2+;Left;DP 6/9/2019 11:30 AM   WOUND NURSE ONLY - Exposed Structures Other (Comments) 6/9/2019 11:30 AM   WOUND NURSE ONLY - Tissue Type and Percentage brown/yellow 100% 6/9/2019 11:30 AM   WOUND NURSE ONLY - Time Spent with Patient (mins) 60 6/9/2019 11:30 AM       Vascular:    Dorsal Pedal pulses:  (+)  Posterior tib pulses:   (+)    ANN:     NA    Lab Values:    WBC:       WBC   Date/Time Value Ref Range Status   06/08/2019 11:04 PM 8.7 4.8 - 10.8 K/uL Final     A1C:      Lab Results   Component Value Date/Time    HBA1C 7.2 (H) 06/08/2019 11:04 PM           Culture:  Not obtained; not enough draiange    INTERVENTIONS BY WOUND TEAM:  Met with patient and wife in ED and discussed history.  Brisk DP per doppler and readily palpable.  [place] wound isn't indurated but pink.  Measurements and photo taken noting that wound base did open with probing and pocket found that possible probes to bone.  All of above discussed with Shira GIVENS via phone/[employer] and she requests patient get a diet and Dr. Martini or Dr. Damian to see patient tomorrow for follow up MRI results and options.  Patient and wife aware of POC.    Dressing selection:  Silver hydrofiber         Interdisciplinary consultation: Patient, Bedside RN, Shira GIVENS    EVALUATION: wound with possible osteomyelitis and MRI results pending.  Patient aware of possible amputation if (+) but would like to discuss with MD prior to any decision.    Factors affecting wound healing: DM  Goals: Steady decrease in wound area and depth weekly.    NURSING PLAN OF CARE  ORDERS (X):    Dressing changes: See Dressing Care orders: X  Skin care: See Skin Care orders:   Rectal tube care: See Rectal Tube Care orders:   Other orders:    RSKIN: CURRENT (X) ORDERED (O):   Q shift Eros:  X  Q shift pressure point assessments:  X  Pressure redistribution mattress   X         JIM          Bariatric JIM         Bariatric foam           Heel float boots          Float Heels off Bed with Pillows  Independent with bed mobility             Barrier wipes         Barrier Cream         Barrier paste          Sacral silicone dressing         Silicone O2 tubing         Anchorfast         Cannula fixation Device (Tender )          Gray Foam Ear protectors           Trach with Optifoam split foam                 Waffle cushion        Waffle Overlay         Rectal tube or BMS         Antifungal tx      Interdry          Reposition q 2 hours  See above      Up to chair        Ambulate      PT/OT        Dietician        Diabetes Education      PO X    TF     TPN     NPO   # days   Other        WOUND TEAM PLAN OF CARE (X):   NPWT change 3 x week:        Dressing changes by wound team:       Follow up as needed:  X LPS to follow     Other (explain):     Anticipated discharge plans (X):   SNF:           Home Care:           Outpatient Wound Center:            Self Care:            Other:   TBD

## 2019-06-09 NOTE — PROGRESS NOTES
"Pharmacy Kinetics 54 y.o. male on vancomycin day # 1 2019    Currently on Vancomycin 3000 mg iv loading dose followed by Vancomycin 2000 mg q12h    Indication for Treatment: Diabetic foot infection, r/o osteomyelitis     Pertinent history per medical record: Admitted on 2019 for osteomyelitis.  Patient presents with a left foot wound that started a few weeks ago.  They were taking Keflex but symptoms worsened.  Patient was in [place] where an xray showed erosive changes in his toe.  MRI ordered and empiric antibiotics started for possible osteomyelitis.      Other antibiotics: Ceftriaxone 2g q24h    Allergies: Pcn [penicillins]     List concerns for renal function: DM    Pertinent cultures to date:     Blood culture x2 -- in process    MRSA nares swab if pneumonia is a concern (ordered/positive/negative/n-a): n/a    Recent Labs      19   2304   WBC  8.7   NEUTSPOLYS  64.90     Recent Labs      19   2304   BUN  20   CREATININE  1.17   ALBUMIN  4.2     No results for input(s): VANCOTROUGH, VANCOPEAK, VANCORANDOM in the last 72 hours.No intake or output data in the 24 hours ending 19 0317   /81   Pulse 92   Temp 36.2 °C (97.1 °F) (Temporal)   Resp 18   Ht 2.057 m (6' 9\")   Wt (!) 139.5 kg (307 lb 8.7 oz)   SpO2 96%  Temp (24hrs), Av.2 °C (97.1 °F), Min:36.2 °C (97.1 °F), Max:36.2 °C (97.1 °F)      A/P   1. Vancomycin dose change: New start  2. Next vancomycin level: Prior to 4th dose (not ordered)  3. Goal trough: 12-16 mcg/mL  4. Comments: Patient has little risk for accumulation.  Will start vancomycin per protocol and recommend getting a level prior to the 4th dose.  Pharmacy will continue to follow.      French Owen, PharmD      "

## 2019-06-09 NOTE — ED NOTES
Med Rec Updated and Complete per Pt at bedside  Allergies Reviewed    Pt was on a 3-day course of Keflex 500mg four times daily from 06/05/19 to 06/07/19.    Pt knows medication history well.    Pt tries to take ASA 325mg daily but sometimes forgets.

## 2019-06-10 ENCOUNTER — ANESTHESIA EVENT (OUTPATIENT)
Dept: SURGERY | Facility: MEDICAL CENTER | Age: 54
DRG: 042 | End: 2019-06-10
Payer: COMMERCIAL

## 2019-06-10 ENCOUNTER — ANESTHESIA (OUTPATIENT)
Dept: SURGERY | Facility: MEDICAL CENTER | Age: 54
DRG: 042 | End: 2019-06-10
Payer: COMMERCIAL

## 2019-06-10 LAB
ANION GAP SERPL CALC-SCNC: 8 MMOL/L (ref 0–11.9)
BUN SERPL-MCNC: 18 MG/DL (ref 8–22)
CALCIUM SERPL-MCNC: 8.7 MG/DL (ref 8.5–10.5)
CHLORIDE SERPL-SCNC: 112 MMOL/L (ref 96–112)
CO2 SERPL-SCNC: 21 MMOL/L (ref 20–33)
CREAT SERPL-MCNC: 1.1 MG/DL (ref 0.5–1.4)
GLUCOSE BLD-MCNC: 122 MG/DL (ref 65–99)
GLUCOSE BLD-MCNC: 133 MG/DL (ref 65–99)
GLUCOSE BLD-MCNC: 144 MG/DL (ref 65–99)
GLUCOSE SERPL-MCNC: 129 MG/DL (ref 65–99)
POTASSIUM SERPL-SCNC: 3.8 MMOL/L (ref 3.6–5.5)
SODIUM SERPL-SCNC: 141 MMOL/L (ref 135–145)
VANCOMYCIN TROUGH SERPL-MCNC: 13.2 UG/ML (ref 10–20)

## 2019-06-10 PROCEDURE — 700111 HCHG RX REV CODE 636 W/ 250 OVERRIDE (IP): Performed by: INTERNAL MEDICINE

## 2019-06-10 PROCEDURE — 160048 HCHG OR STATISTICAL LEVEL 1-5: Performed by: ORTHOPAEDIC SURGERY

## 2019-06-10 PROCEDURE — 80048 BASIC METABOLIC PNL TOTAL CA: CPT

## 2019-06-10 PROCEDURE — 160009 HCHG ANES TIME/MIN: Performed by: ORTHOPAEDIC SURGERY

## 2019-06-10 PROCEDURE — 160036 HCHG PACU - EA ADDL 30 MINS PHASE I: Performed by: ORTHOPAEDIC SURGERY

## 2019-06-10 PROCEDURE — 3E0T3BZ INTRODUCTION OF ANESTHETIC AGENT INTO PERIPHERAL NERVES AND PLEXI, PERCUTANEOUS APPROACH: ICD-10-PCS | Performed by: ANESTHESIOLOGY

## 2019-06-10 PROCEDURE — 160028 HCHG SURGERY MINUTES - 1ST 30 MINS LEVEL 3: Performed by: ORTHOPAEDIC SURGERY

## 2019-06-10 PROCEDURE — 500423 HCHG DRESSING, ABD COMBINE: Performed by: ORTHOPAEDIC SURGERY

## 2019-06-10 PROCEDURE — 700102 HCHG RX REV CODE 250 W/ 637 OVERRIDE(OP): Performed by: INTERNAL MEDICINE

## 2019-06-10 PROCEDURE — 82962 GLUCOSE BLOOD TEST: CPT | Mod: 91

## 2019-06-10 PROCEDURE — 770006 HCHG ROOM/CARE - MED/SURG/GYN SEMI*

## 2019-06-10 PROCEDURE — 0QBP0ZZ EXCISION OF LEFT METATARSAL, OPEN APPROACH: ICD-10-PCS | Performed by: ORTHOPAEDIC SURGERY

## 2019-06-10 PROCEDURE — 88311 DECALCIFY TISSUE: CPT

## 2019-06-10 PROCEDURE — 99232 SBSQ HOSP IP/OBS MODERATE 35: CPT | Performed by: INTERNAL MEDICINE

## 2019-06-10 PROCEDURE — 88305 TISSUE EXAM BY PATHOLOGIST: CPT

## 2019-06-10 PROCEDURE — 700105 HCHG RX REV CODE 258: Performed by: ANESTHESIOLOGY

## 2019-06-10 PROCEDURE — 80202 ASSAY OF VANCOMYCIN: CPT

## 2019-06-10 PROCEDURE — 700101 HCHG RX REV CODE 250: Performed by: ANESTHESIOLOGY

## 2019-06-10 PROCEDURE — 501838 HCHG SUTURE GENERAL: Performed by: ORTHOPAEDIC SURGERY

## 2019-06-10 PROCEDURE — 700105 HCHG RX REV CODE 258: Performed by: INTERNAL MEDICINE

## 2019-06-10 PROCEDURE — 0Y6Y0Z0 DETACHMENT AT LEFT 5TH TOE, COMPLETE, OPEN APPROACH: ICD-10-PCS | Performed by: ORTHOPAEDIC SURGERY

## 2019-06-10 PROCEDURE — 500881 HCHG PACK, EXTREMITY: Performed by: ORTHOPAEDIC SURGERY

## 2019-06-10 PROCEDURE — 160035 HCHG PACU - 1ST 60 MINS PHASE I: Performed by: ORTHOPAEDIC SURGERY

## 2019-06-10 PROCEDURE — 36415 COLL VENOUS BLD VENIPUNCTURE: CPT

## 2019-06-10 PROCEDURE — 700111 HCHG RX REV CODE 636 W/ 250 OVERRIDE (IP): Performed by: ANESTHESIOLOGY

## 2019-06-10 PROCEDURE — A6223 GAUZE >16<=48 NO W/SAL W/O B: HCPCS | Performed by: ORTHOPAEDIC SURGERY

## 2019-06-10 PROCEDURE — A9270 NON-COVERED ITEM OR SERVICE: HCPCS | Performed by: INTERNAL MEDICINE

## 2019-06-10 PROCEDURE — 160002 HCHG RECOVERY MINUTES (STAT): Performed by: ORTHOPAEDIC SURGERY

## 2019-06-10 RX ORDER — VANCOMYCIN HYDROCHLORIDE 500 MG/10ML
INJECTION, POWDER, LYOPHILIZED, FOR SOLUTION INTRAVENOUS
Status: COMPLETED | OUTPATIENT
Start: 2019-06-10 | End: 2019-06-10

## 2019-06-10 RX ORDER — BUPIVACAINE HYDROCHLORIDE 5 MG/ML
INJECTION, SOLUTION EPIDURAL; INTRACAUDAL
Status: DISCONTINUED | OUTPATIENT
Start: 2019-06-10 | End: 2019-06-10 | Stop reason: HOSPADM

## 2019-06-10 RX ORDER — LIDOCAINE HYDROCHLORIDE 10 MG/ML
INJECTION, SOLUTION INFILTRATION; PERINEURAL
Status: DISCONTINUED | OUTPATIENT
Start: 2019-06-10 | End: 2019-06-10 | Stop reason: HOSPADM

## 2019-06-10 RX ORDER — OXYCODONE HCL 5 MG/5 ML
5 SOLUTION, ORAL ORAL
Status: DISCONTINUED | OUTPATIENT
Start: 2019-06-10 | End: 2019-06-10 | Stop reason: HOSPADM

## 2019-06-10 RX ORDER — HYDROMORPHONE HYDROCHLORIDE 1 MG/ML
0.4 INJECTION, SOLUTION INTRAMUSCULAR; INTRAVENOUS; SUBCUTANEOUS
Status: DISCONTINUED | OUTPATIENT
Start: 2019-06-10 | End: 2019-06-10 | Stop reason: HOSPADM

## 2019-06-10 RX ORDER — HYDROMORPHONE HYDROCHLORIDE 1 MG/ML
0.2 INJECTION, SOLUTION INTRAMUSCULAR; INTRAVENOUS; SUBCUTANEOUS
Status: DISCONTINUED | OUTPATIENT
Start: 2019-06-10 | End: 2019-06-10 | Stop reason: HOSPADM

## 2019-06-10 RX ORDER — ONDANSETRON 2 MG/ML
4 INJECTION INTRAMUSCULAR; INTRAVENOUS
Status: DISCONTINUED | OUTPATIENT
Start: 2019-06-10 | End: 2019-06-10 | Stop reason: HOSPADM

## 2019-06-10 RX ORDER — HALOPERIDOL 5 MG/ML
1 INJECTION INTRAMUSCULAR
Status: DISCONTINUED | OUTPATIENT
Start: 2019-06-10 | End: 2019-06-10 | Stop reason: HOSPADM

## 2019-06-10 RX ORDER — SODIUM CHLORIDE, SODIUM GLUCONATE, SODIUM ACETATE, POTASSIUM CHLORIDE AND MAGNESIUM CHLORIDE 526; 502; 368; 37; 30 MG/100ML; MG/100ML; MG/100ML; MG/100ML; MG/100ML
INJECTION, SOLUTION INTRAVENOUS
Status: DISCONTINUED | OUTPATIENT
Start: 2019-06-10 | End: 2019-06-10 | Stop reason: SURG

## 2019-06-10 RX ORDER — MIDAZOLAM HYDROCHLORIDE 1 MG/ML
1 INJECTION INTRAMUSCULAR; INTRAVENOUS
Status: DISCONTINUED | OUTPATIENT
Start: 2019-06-10 | End: 2019-06-10 | Stop reason: HOSPADM

## 2019-06-10 RX ORDER — OXYCODONE HCL 5 MG/5 ML
10 SOLUTION, ORAL ORAL
Status: DISCONTINUED | OUTPATIENT
Start: 2019-06-10 | End: 2019-06-10 | Stop reason: HOSPADM

## 2019-06-10 RX ORDER — MEPERIDINE HYDROCHLORIDE 25 MG/ML
12.5 INJECTION INTRAMUSCULAR; INTRAVENOUS; SUBCUTANEOUS
Status: DISCONTINUED | OUTPATIENT
Start: 2019-06-10 | End: 2019-06-10 | Stop reason: HOSPADM

## 2019-06-10 RX ORDER — DIPHENHYDRAMINE HYDROCHLORIDE 50 MG/ML
12.5 INJECTION INTRAMUSCULAR; INTRAVENOUS
Status: DISCONTINUED | OUTPATIENT
Start: 2019-06-10 | End: 2019-06-10 | Stop reason: HOSPADM

## 2019-06-10 RX ORDER — HYDROMORPHONE HYDROCHLORIDE 1 MG/ML
1 INJECTION, SOLUTION INTRAMUSCULAR; INTRAVENOUS; SUBCUTANEOUS
Status: DISCONTINUED | OUTPATIENT
Start: 2019-06-10 | End: 2019-06-10 | Stop reason: HOSPADM

## 2019-06-10 RX ORDER — IPRATROPIUM BROMIDE AND ALBUTEROL SULFATE 2.5; .5 MG/3ML; MG/3ML
3 SOLUTION RESPIRATORY (INHALATION)
Status: DISCONTINUED | OUTPATIENT
Start: 2019-06-10 | End: 2019-06-10 | Stop reason: HOSPADM

## 2019-06-10 RX ORDER — SODIUM CHLORIDE, SODIUM GLUCONATE, SODIUM ACETATE, POTASSIUM CHLORIDE AND MAGNESIUM CHLORIDE 526; 502; 368; 37; 30 MG/100ML; MG/100ML; MG/100ML; MG/100ML; MG/100ML
500 INJECTION, SOLUTION INTRAVENOUS CONTINUOUS
Status: DISCONTINUED | OUTPATIENT
Start: 2019-06-10 | End: 2019-06-10 | Stop reason: HOSPADM

## 2019-06-10 RX ADMIN — SODIUM CHLORIDE, POTASSIUM CHLORIDE, SODIUM LACTATE AND CALCIUM CHLORIDE: 600; 310; 30; 20 INJECTION, SOLUTION INTRAVENOUS at 17:52

## 2019-06-10 RX ADMIN — LIDOCAINE HYDROCHLORIDE 50 MG: 20 INJECTION, SOLUTION INTRAVENOUS at 17:56

## 2019-06-10 RX ADMIN — PROPOFOL 50 MG: 10 INJECTION, EMULSION INTRAVENOUS at 18:02

## 2019-06-10 RX ADMIN — VANCOMYCIN HYDROCHLORIDE 2000 MG: 100 INJECTION, POWDER, LYOPHILIZED, FOR SOLUTION INTRAVENOUS at 16:14

## 2019-06-10 RX ADMIN — PROPOFOL 40 MG: 10 INJECTION, EMULSION INTRAVENOUS at 18:12

## 2019-06-10 RX ADMIN — SODIUM CHLORIDE, POTASSIUM CHLORIDE, SODIUM LACTATE AND CALCIUM CHLORIDE: 600; 310; 30; 20 INJECTION, SOLUTION INTRAVENOUS at 04:09

## 2019-06-10 RX ADMIN — SODIUM CHLORIDE, SODIUM GLUCONATE, SODIUM ACETATE, POTASSIUM CHLORIDE AND MAGNESIUM CHLORIDE 500 ML: 526; 502; 368; 37; 30 INJECTION, SOLUTION INTRAVENOUS at 18:56

## 2019-06-10 RX ADMIN — PROPOFOL 50 MG: 10 INJECTION, EMULSION INTRAVENOUS at 17:59

## 2019-06-10 RX ADMIN — ATORVASTATIN CALCIUM 40 MG: 40 TABLET, FILM COATED ORAL at 06:34

## 2019-06-10 RX ADMIN — PROPOFOL 80 MG: 10 INJECTION, EMULSION INTRAVENOUS at 17:56

## 2019-06-10 RX ADMIN — PROPOFOL 50 MG: 10 INJECTION, EMULSION INTRAVENOUS at 18:07

## 2019-06-10 RX ADMIN — LISINOPRIL 10 MG: 10 TABLET ORAL at 06:34

## 2019-06-10 RX ADMIN — PROPOFOL 50 MG: 10 INJECTION, EMULSION INTRAVENOUS at 18:05

## 2019-06-10 RX ADMIN — PROPOFOL 30 MG: 10 INJECTION, EMULSION INTRAVENOUS at 18:09

## 2019-06-10 RX ADMIN — VANCOMYCIN HYDROCHLORIDE 2000 MG: 100 INJECTION, POWDER, LYOPHILIZED, FOR SOLUTION INTRAVENOUS at 04:09

## 2019-06-10 RX ADMIN — CEFTRIAXONE SODIUM 2 G: 2 INJECTION, POWDER, FOR SOLUTION INTRAMUSCULAR; INTRAVENOUS at 06:34

## 2019-06-10 RX ADMIN — SODIUM CHLORIDE, SODIUM GLUCONATE, SODIUM ACETATE, POTASSIUM CHLORIDE AND MAGNESIUM CHLORIDE: 526; 502; 368; 37; 30 INJECTION, SOLUTION INTRAVENOUS at 18:10

## 2019-06-10 ASSESSMENT — PAIN SCALES - GENERAL: PAIN_LEVEL: 0

## 2019-06-10 ASSESSMENT — ENCOUNTER SYMPTOMS
FEVER: 0
WEAKNESS: 0
MYALGIAS: 0
SENSORY CHANGE: 1
ABDOMINAL PAIN: 0

## 2019-06-10 NOTE — PROGRESS NOTES
Received report from night shift nurse.   Assumed care of patient.   Assessment complete.     Patient A&O x  4    Patient denies  SOB or Chest pain.   Patient's respirations are even and unlabored.   Patient diet NPO for surgery  , Last BM 6/10 , Bowel sounds normoactive x 4 quadrants.   Patient denies pain.   Patient's mobility is up self  Pulses 2+ x 4 extremities.   Skin: wound on the left 5th toe. Dressing in place. Dressing clean, dry, and intact.     Discussed POC with patient and answered any questions that the patient had.   Reinforced use of call light. Bed locked and in lowest position. Belongings and call light in reach. Hourly rounding in place to check on patient's well being.

## 2019-06-10 NOTE — CONSULTS
Diabetes Nurse Specialist:   Patient is a 55 yo male with type 2 diabetes, uncontrolled, admitted for infection on left 5th toe.   Patient outpatient diabetes medication consists of Metformin 1000 mg bid, Jardiance 10 mg daily and states he just started Ozempic 0.25 mg last week.   He monitors his blood sugars at home 2x per day and states most blood sugars are less than 120  He states he has been working hard at decreasing the amount of carbohydrates he is consuming and trying to exercise more.   Discussed goal of A1c to be less than 7%  Discussed possible side effects of Jardiance and need to stay well hydrated.   Discussed possible side effects of Ozempic.   Discussed continuation of lifestyle modification after discharge.   Discussed foot care, patient states he has had some open sores on his left foot for several weeks and has been keeping a close eye on them.

## 2019-06-10 NOTE — DISCHARGE PLANNING
Anticipated Discharge Disposition: Home with unknown needs at this time.    Action: RN CM assessed pt at bedside.  Pt's spouse Sara present.  Pt reports he lives in a 1 story home with his spouse and is independent with all ADLs, IADLs, and anticipates being discharged home with wound care and oral antibiotics. Pt states no history of substance abuse or mental health diagnosis. Pt reports no financial barriers and has prescription coverage through his insurance.    Barriers to Discharge:   Surgery scheduled today at 1730.  Medical clearance for discharge.    Plan: RN CM will follow and set up outpatient services as needed upon receipt of medical clearance.    MD and RN to kindly notify RN CM of changes to POC.    Care Transition Team Assessment    Information Source  Information Given By: Patient  Who is responsible for making decisions for patient? : Patient    Readmission Evaluation  Is this a readmission?: No    Elopement Risk  Legal Hold: No  Ambulatory or Self Mobile in Wheelchair: Yes  Disoriented: No  Psychiatric Symptoms: None  History of Wandering: No  Elopement this Admit: No  Vocalizing Wanting to Leave: No  Displays Behaviors, Body Language Wanting to Leave: No-Not at Risk for Elopement  Elopement Risk: Not at Risk for Elopement    Interdisciplinary Discharge Planning  Does Admitting Nurse Feel This Could be a Complex Discharge?: No  Primary Care Physician: NIKOLAS Taylor  Lives with - Patient's Self Care Capacity: Spouse  Patient or legal guardian wants to designate a caregiver (see row info): No  Support Systems: Children, Family Member(s), Spouse / Significant Other  Housing / Facility: 1 Story House  Do You Take your Prescribed Medications Regularly: Yes  Able to Return to Previous ADL's: Yes  Mobility Issues: No  Prior Services: None  Patient Expects to be Discharged to:: Home  Assistance Needed: No  Durable Medical Equipment: Not Applicable    Discharge Preparedness  What is your plan after  discharge?: Home with help  What are your discharge supports?: Spouse  Prior Functional Level: Drives Self, Ambulatory, Independent with Activities of Daily Living, Independent with Medication Management  Difficulity with ADLs: None  Difficulity with IADLs: None    Functional Assesment  Prior Functional Level: Drives Self, Ambulatory, Independent with Activities of Daily Living, Independent with Medication Management    Finances  Financial Barriers to Discharge: No  Prescription Coverage: Yes    Vision / Hearing Impairment  Vision Impairment : No  Hearing Impairment : No         Advance Directive  Advance Directive?: None  Advance Directive offered?: AD Booklet refused    Domestic Abuse  Have you ever been the victim of abuse or violence?: No  Physical Abuse or Sexual Abuse: No  Verbal Abuse or Emotional Abuse: No  Possible Abuse Reported to:: Not Applicable    Psychological Assessment  History of Substance Abuse: None  History of Psychiatric Problems: No  Non-compliant with Treatment: No  Newly Diagnosed Illness: Yes    Discharge Risks or Barriers  Discharge risks or barriers?: No    Anticipated Discharge Information  Anticipated discharge disposition: Home  Discharge Address: [address] [place] [address] [place]  Discharge Contact Phone Number: [phone]

## 2019-06-10 NOTE — PROGRESS NOTES
Ortho Note  Formal consult to be dictated.  54M with DM and a left 5th toe dorsal wound with signs of osteo  Clinically infected  Plan for 5th toe amp today- risks and benefits discussed with patient who is in agreement    Burak Russo MD  [facility]  Foot and Ankle Fellow  [phone]

## 2019-06-10 NOTE — PROGRESS NOTES
Received bedside report and assumed care at 1900    Pt is A&O x4  Pain 0/10  Denies nausea  Tolerating diabetic diet  + Urine Output  + Flatus  LBM pta  Wound to left 5th digit, dressing in place to be changed q 48hrs  Up self   Family at bedside  Bed in lowest position and locked  Reviewed plan of care with patient, pt resting comfortably now, call light within reach, all needs met at this time

## 2019-06-10 NOTE — PROGRESS NOTES
[facility] Medicine Daily Progress Note    Date of Service  6/10/2019    Chief Complaint  54 y.o. male admitted 6/8/2019 with Left 5th Diabetic foot infection    Hospital Course    See below    Interval Problem Update  Diabetic foot infection-MRI confirms OM of the 5th toe. Plan for surgey. Minimal pain. Afebrile.     Consultants/Specialty  LPS    Code Status  fcfc    Disposition  ORTHO    Review of Systems  Review of Systems   Constitutional: Negative for fever.   Gastrointestinal: Negative for abdominal pain.   Genitourinary: Negative for urgency.   Musculoskeletal: Positive for joint pain (mild). Negative for myalgias.        Minimal   Neurological: Positive for sensory change (chronic neuropathy). Negative for weakness.   All other systems reviewed and are negative.       Physical Exam  Temp:  [36.1 °C (97 °F)-36.7 °C (98.1 °F)] 36.1 °C (97 °F)  Pulse:  [63-76] 66  Resp:  [17] 17  BP: (124-154)/(61-90) 154/90  SpO2:  [94 %-96 %] 96 %    Physical Exam   Constitutional: He is oriented to person, place, and time. He appears well-developed and well-nourished.   HENT:   Head: Normocephalic and atraumatic.   Eyes: Pupils are equal, round, and reactive to light. Right eye exhibits no discharge. Left eye exhibits no discharge.   Neck: Normal range of motion. Neck supple.   Cardiovascular: Normal rate, regular rhythm, normal heart sounds and intact distal pulses.  Exam reveals no gallop.    Pulmonary/Chest: Effort normal and breath sounds normal. No stridor. He has no wheezes. He has no rales.   Abdominal: Soft. Bowel sounds are normal. He exhibits no distension.   Musculoskeletal: Normal range of motion. He exhibits tenderness. He exhibits no edema.   Small ulcer on the lateral aspect of the 5th toe of the L foot, minimal surrounding erythema, mild TTP   Neurological: He is alert and oriented to person, place, and time. Coordination normal.   Skin: Skin is warm and dry. No rash noted.   Psychiatric: He has a normal mood  and affect.   Nursing note and vitals reviewed.      Fluids    Intake/Output Summary (Last 24 hours) at 06/10/19 1302  Last data filed at 06/10/19 1200   Gross per 24 hour   Intake             1660 ml   Output                0 ml   Net             1660 ml       Laboratory  Recent Labs      06/08/19   2304   WBC  8.7   RBC  5.43   HEMOGLOBIN  15.3   HEMATOCRIT  45.9   MCV  84.5   MCH  28.2   MCHC  33.3*   RDW  37.2   PLATELETCT  193   MPV  9.9     Recent Labs      06/08/19   2304  06/10/19   0155   SODIUM  138  141   POTASSIUM  4.0  3.8   CHLORIDE  110  112   CO2  17*  21   GLUCOSE  114*  129*   BUN  20  18   CREATININE  1.17  1.10   CALCIUM  9.1  8.7                   Imaging  MR-FOOT-W/O LEFT   Final Result      1.  Marrow edema and cartilage loss with subchondral cystic change and osteophytic spurring involving the first, second and third MTP joints. There is also surrounding soft tissue prominence likely representing cellulitis with minimal joint fluid.    Considering the presence of arthropathy, this is unlikely to represent osteomyelitis and most likely represents presence of osteoarthritis versus inflammatory or crystal-induced arthropathy.      2.  Marrow edema involving the fifth proximal, middle and distal phalanges with surrounding soft tissue swelling. There is relative sparing of the fifth metatarsal head. Considering the absence of significant arthropathy involving this joint, these    findings are worrisome for osteomyelitis.      3.  Soft tissue swelling involving the dorsum of the foot likely representing cellulitis.      4.  Prominent osteoarthritis of the tarsal metatarsal joints and the articulations of the midfoot.      OUTSIDE IMAGES-DX LOWER EXTREMITY, LEFT   Final Result           Assessment/Plan  Diabetic infection of left foot (HCC)- (present on admission)   Assessment & Plan    I have started the patient on IV ceftriaxone and IV vancomycin.  Monitor for vancomycin toxicity and follow trough  levels, check daily trough, goal levels in 15-20  Follow blood and wound culture-pending  ESR, CRP elevated  -MRI of the Foot shows likely OM  Limb preservation service and wound care have been consulted  -going for 5th toe amputation       Hyperlipidemia- (present on admission)   Assessment & Plan    Continue Lipitor     Essential hypertension- (present on admission)   Assessment & Plan    Continue lisinopril 10 mg daily, monitor     Diabetes mellitus type 2, uncontrolled, with complications (HCC)- (present on admission)   Assessment & Plan    Start on insulin sliding scale with serial Accu-Checks, sugars remain well controlled  Check hemoglobin A1c  Hypoglycemic protocol in place              VTE prophylaxis: heparin

## 2019-06-10 NOTE — CARE PLAN
Problem: Venous Thromboembolism (VTW)/Deep Vein Thrombosis (DVT) Prevention:  Goal: Patient will participate in Venous Thrombosis (VTE)/Deep Vein Thrombosis (DVT)Prevention Measures  Outcome: PROGRESSING AS EXPECTED  Patient receiving heparin    Problem: Fluid Volume:  Goal: Will maintain balanced intake and output  Outcome: PROGRESSING AS EXPECTED  Patient receiving IV fluids and voiding adequately

## 2019-06-11 ENCOUNTER — PATIENT OUTREACH (OUTPATIENT)
Dept: HEALTH INFORMATION MANAGEMENT | Facility: OTHER | Age: 54
End: 2019-06-11

## 2019-06-11 VITALS
HEART RATE: 64 BPM | DIASTOLIC BLOOD PRESSURE: 69 MMHG | WEIGHT: 307.54 LBS | HEIGHT: 78 IN | RESPIRATION RATE: 19 BRPM | BODY MASS INDEX: 35.58 KG/M2 | TEMPERATURE: 99.4 F | SYSTOLIC BLOOD PRESSURE: 128 MMHG | OXYGEN SATURATION: 97 %

## 2019-06-11 LAB
BACTERIA WND AEROBE CULT: ABNORMAL
BACTERIA WND AEROBE CULT: ABNORMAL
GLUCOSE BLD-MCNC: 110 MG/DL (ref 65–99)
GRAM STN SPEC: ABNORMAL
PATHOLOGY CONSULT NOTE: NORMAL
SIGNIFICANT IND 70042: ABNORMAL
SITE SITE: ABNORMAL
SOURCE SOURCE: ABNORMAL
VANCOMYCIN TROUGH SERPL-MCNC: 22.3 UG/ML (ref 10–20)

## 2019-06-11 PROCEDURE — 82962 GLUCOSE BLOOD TEST: CPT

## 2019-06-11 PROCEDURE — A9270 NON-COVERED ITEM OR SERVICE: HCPCS | Performed by: INTERNAL MEDICINE

## 2019-06-11 PROCEDURE — 700111 HCHG RX REV CODE 636 W/ 250 OVERRIDE (IP): Performed by: INTERNAL MEDICINE

## 2019-06-11 PROCEDURE — 700102 HCHG RX REV CODE 250 W/ 637 OVERRIDE(OP): Performed by: INTERNAL MEDICINE

## 2019-06-11 PROCEDURE — 97165 OT EVAL LOW COMPLEX 30 MIN: CPT

## 2019-06-11 PROCEDURE — 36415 COLL VENOUS BLD VENIPUNCTURE: CPT

## 2019-06-11 PROCEDURE — 80202 ASSAY OF VANCOMYCIN: CPT

## 2019-06-11 PROCEDURE — 97161 PT EVAL LOW COMPLEX 20 MIN: CPT

## 2019-06-11 PROCEDURE — 700105 HCHG RX REV CODE 258: Performed by: INTERNAL MEDICINE

## 2019-06-11 PROCEDURE — 99239 HOSP IP/OBS DSCHRG MGMT >30: CPT | Performed by: INTERNAL MEDICINE

## 2019-06-11 PROCEDURE — 700105 HCHG RX REV CODE 258

## 2019-06-11 RX ORDER — SULFAMETHOXAZOLE AND TRIMETHOPRIM 800; 160 MG/1; MG/1
1 TABLET ORAL EVERY 12 HOURS
Qty: 28 TAB | Refills: 0 | Status: SHIPPED | OUTPATIENT
Start: 2019-06-11 | End: 2019-06-25

## 2019-06-11 RX ORDER — SULFAMETHOXAZOLE AND TRIMETHOPRIM 800; 160 MG/1; MG/1
1 TABLET ORAL EVERY 12 HOURS
Status: DISCONTINUED | OUTPATIENT
Start: 2019-06-11 | End: 2019-06-11 | Stop reason: HOSPADM

## 2019-06-11 RX ORDER — SODIUM CHLORIDE 9 MG/ML
INJECTION, SOLUTION INTRAVENOUS
Status: COMPLETED
Start: 2019-06-11 | End: 2019-06-11

## 2019-06-11 RX ADMIN — CEFTRIAXONE SODIUM 2 G: 2 INJECTION, POWDER, FOR SOLUTION INTRAMUSCULAR; INTRAVENOUS at 05:21

## 2019-06-11 RX ADMIN — VANCOMYCIN HYDROCHLORIDE 2000 MG: 100 INJECTION, POWDER, LYOPHILIZED, FOR SOLUTION INTRAVENOUS at 05:22

## 2019-06-11 RX ADMIN — SULFAMETHOXAZOLE AND TRIMETHOPRIM 1 TABLET: 800; 160 TABLET ORAL at 09:49

## 2019-06-11 RX ADMIN — LISINOPRIL 10 MG: 10 TABLET ORAL at 05:21

## 2019-06-11 RX ADMIN — SODIUM CHLORIDE 500 ML: 9 INJECTION, SOLUTION INTRAVENOUS at 05:22

## 2019-06-11 RX ADMIN — ATORVASTATIN CALCIUM 40 MG: 40 TABLET, FILM COATED ORAL at 05:21

## 2019-06-11 ASSESSMENT — COGNITIVE AND FUNCTIONAL STATUS - GENERAL
MOBILITY SCORE: 24
SUGGESTED CMS G CODE MODIFIER DAILY ACTIVITY: CH
DAILY ACTIVITIY SCORE: 24
SUGGESTED CMS G CODE MODIFIER MOBILITY: CH

## 2019-06-11 ASSESSMENT — GAIT ASSESSMENTS
DISTANCE (FEET): 200
GAIT LEVEL OF ASSIST: SUPERVISED

## 2019-06-11 ASSESSMENT — ACTIVITIES OF DAILY LIVING (ADL): TOILETING: INDEPENDENT

## 2019-06-11 NOTE — PROGRESS NOTES
XL Diabetic boot has been sized and placed at Pts bedside. Please call traction at x [phone] for further assistance

## 2019-06-11 NOTE — PROGRESS NOTES
Offloading boot was replaced for an offloading shoe on RLE at PT's request.  If any further assistance needed, please call extension [phone] or place order for Ortho Technician assistance as a communication order in [employer].

## 2019-06-11 NOTE — ANESTHESIA PREPROCEDURE EVALUATION
Relevant Problems   (+) Essential hypertension       Physical Exam    Airway   Mallampati: II  TM distance: >3 FB  Neck ROM: full       Cardiovascular - normal exam  Rhythm: regular  Rate: normal  (-) murmur     Dental - normal exam         Pulmonary - normal exam  Breath sounds clear to auscultation     Abdominal    Neurological - normal exam         Other findings: obese            Anesthesia Plan    ASA 3 - emergent       Plan - general       Airway plan will be natural airway  (Poorly controled diabetes,  Acute open infected left 5th toe wound)      Induction: intravenous    Postoperative Plan: Postoperative administration of opioids is intended.    Pertinent diagnostic labs and testing reviewed    Informed Consent:    Anesthetic plan and risks discussed with patient.    Use of blood products discussed with: patient whom consented to blood products.

## 2019-06-11 NOTE — PROGRESS NOTES
"Pharmacy Kinetics 54 y.o. male on vancomycin day # 2  6/10/2019    Currently on Vancomycin 2,000 mg iv q12hr (0400, 1600)    Indication for Treatment: Diabetic foot infection, r/o osteomyelitis      Pertinent history per medical record: Admitted on 2019 for osteomyelitis.  Patient presents with a left foot wound that started a few weeks ago.  They were taking Keflex but symptoms worsened.  Patient was in [place] where an xray showed erosive changes in his toe.  MRI ordered and empiric antibiotics started for possible osteomyelitis.       Other antibiotics: Ceftriaxone 2g q24h     Allergies: Pcn [penicillins]      List concerns for renal function: DM     Pertinent cultures to date:   19: L foot wound cx - Staph Aureus - awaiting sensitivities     19: Blood cultures -  NGTD      MRSA nares swab if pneumonia is a concern (ordered/positive/negative/n-a): n/a    Recent Labs      19   2304   WBC  8.7   NEUTSPOLYS  64.90     Recent Labs      19   2304  06/10/19   0155   BUN  20  18   CREATININE  1.17  1.10   ALBUMIN  4.2   --      Recent Labs      06/10/19   0155   VANCOTROUGH  13.2     Intake/Output Summary (Last 24 hours) at 06/10/19 1756  Last data filed at 06/10/19 1600   Gross per 24 hour   Intake             1992 ml   Output                0 ml   Net             1992 ml      /85   Pulse 62   Temp 36.2 °C (97.2 °F) (Temporal)   Resp 16   Ht 2.057 m (6' 9\")   Wt (!) 139.5 kg (307 lb 8.7 oz)   SpO2 99%  Temp (24hrs), Av.4 °C (97.5 °F), Min:36.1 °C (97 °F), Max:36.7 °C (98.1 °F)      A/P   1. Vancomycin dose change: No.   2. Next vancomycin level: Tomorrow, 19 at 1530   3. Goal trough: 12 - 16 mcg/mL   4. Comments: Undergoing 5th toe amp today with ortho surgery. Culture positive with staph aureus awaiting sensitivities.     Deneen Menon, PharmD          "

## 2019-06-11 NOTE — DISCHARGE SUMMARY
Discharge Summary    CHIEF COMPLAINT ON ADMISSION  Chief Complaint   Patient presents with   • Wound Infection     injured right 5th toe a month ago, has developed diabetic ulcer with swelling and redness, xray in [facility] showed possible osteomyelitis   • Sent by MD     seen in a [facility] ED yesterday, was supposed to be admitted but was down there for vacation  decided to come home and be admitted        Reason for Admission  Wound infection    Admission Date  6/8/2019    CODE STATUS  Prior    HPI & HOSPITAL COURSE  This is a 54 y.o. male here with osteomyelitis of the left fifth toe and underwent fifth toe amputation 6/10.  Patient tolerated procedure very well.  Patient was also seen by LPS team.  Patient is recommended to take oral antibiotics for 2 weeks postop and follow-up with orthopedics as outpatient.  From surgery report patient has no residual left over osteomyelitis.  Pending culture result and pathology result.  Patient will be discharged home with oral antibiotics recommended by orthopedics and follow-up with orthopedics in 1 week.          Therefore, he is discharged in good and stable condition to home with close outpatient follow-up.    The patient met 2-midnight criteria for an inpatient stay at the time of discharge.    Discharge Date  6/11/2019    FOLLOW UP ITEMS POST DISCHARGE  none    DISCHARGE DIAGNOSES      Diabetic infection of left foot (HCC) POA: Yes    Diabetes mellitus type 2, uncontrolled, with complications (HCC) POA: Yes    Essential hypertension POA: Yes    Hyperlipidemia POA: Yes        FOLLOW UP  Future Appointments  Date Time Provider Department Center   8/27/2019 9:00 AM [facility] DIABETES RN JEROD Vaughn     Wound Care Center  [address] [facility] [address]  [facility] [address]  [phone]    Please call to schedule follow up with Dr Martini. Thank you      MEDICATIONS ON DISCHARGE     Medication List      START taking these medications      Instructions    sulfamethoxazole-trimethoprim 800-160 MG tablet  Commonly known as:  BACTRIM DS   Take 1 Tab by mouth every 12 hours for 14 days.  Dose:  1 Tab        CONTINUE taking these medications      Instructions   aspirin 325 MG Tabs  Commonly known as:  ASA   Take 325 mg by mouth every day.  Dose:  325 mg     atorvastatin 40 MG Tabs  Commonly known as:  LIPITOR   Take 40 mg by mouth every morning.  Dose:  40 mg     ibuprofen 200 MG Tabs  Commonly known as:  MOTRIN   Take 800 mg by mouth 2 times a day as needed (Right Shoulder Pain).  Dose:  800 mg     JARDIANCE 10 MG Tabs  Generic drug:  Empagliflozin   Take 10 mg by mouth every day.  Dose:  10 mg     lisinopril 10 MG Tabs  Commonly known as:  PRINIVIL   Take 10 mg by mouth every day.  Dose:  10 mg     metformin 1000 MG tablet  Commonly known as:  GLUCOPHAGE   Take 1,000 mg by mouth 2 times a day, with meals.  Dose:  1000 mg     OZEMPIC 0.25 or 0.5 MG/DOSE Sopn  Generic drug:  Semaglutide   Inject 0.5 mg as instructed every Monday.  Dose:  0.5 mg        STOP taking these medications    cephALEXin 500 MG Caps  Commonly known as:  KEFLEX            Allergies  Allergies   Allergen Reactions   • Pcn [Penicillins]      Had as a child       DIET  No orders of the defined types were placed in this encounter.      ACTIVITY  As tolerated.  Weight bearing as tolerated    CONSULTATIONS  ortho    PROCEDURES  PROCEDURES PERFORMED:  1.  Left fifth toe metatarsophalangeal joint disarticulation.  2.  Left foot irrigation and debridement, dorsal plantar compartments.    MR-FOOT-W/O LEFT   Final Result      1.  Marrow edema and cartilage loss with subchondral cystic change and osteophytic spurring involving the first, second and third MTP joints. There is also surrounding soft tissue prominence likely representing cellulitis with minimal joint fluid.    Considering the presence of arthropathy, this is unlikely to represent osteomyelitis and most likely represents presence of osteoarthritis  versus inflammatory or crystal-induced arthropathy.      2.  Marrow edema involving the fifth proximal, middle and distal phalanges with surrounding soft tissue swelling. There is relative sparing of the fifth metatarsal head. Considering the absence of significant arthropathy involving this joint, these    findings are worrisome for osteomyelitis.      3.  Soft tissue swelling involving the dorsum of the foot likely representing cellulitis.      4.  Prominent osteoarthritis of the tarsal metatarsal joints and the articulations of the midfoot.      OUTSIDE IMAGES-DX LOWER EXTREMITY, LEFT   Final Result          PE:  Gen: AAOx3, NAD  Eyes: PELLA  Neck: no JVD, no lymphadenopathy  Cardia: RRR, no mrg  Lungs: CTAB, no rales, rhonci or wheezing  Abd: NABS, soft, non extended, no mass  EXT: No C/C/E, peripheral pulse 2+ b/l  Neuro: CN II-XII intact, non focal, reflex 2+ symmetrical  Skin: Intact, no lesion, warm  Psych: Appropriate.      LABORATORY  Lab Results   Component Value Date    SODIUM 141 06/10/2019    POTASSIUM 3.8 06/10/2019    CHLORIDE 112 06/10/2019    CO2 21 06/10/2019    GLUCOSE 129 (H) 06/10/2019    BUN 18 06/10/2019    CREATININE 1.10 06/10/2019        Lab Results   Component Value Date    WBC 8.7 06/08/2019    HEMOGLOBIN 15.3 06/08/2019    HEMATOCRIT 45.9 06/08/2019    PLATELETCT 193 06/08/2019        Total time of the discharge process exceeds 38 minutes.

## 2019-06-11 NOTE — PROGRESS NOTES
POST-OP NOTE FOR LIMB PRESERVATION SERVICE    SURGERY DATE: 6/10/2019    PROCEDURE: Procedure(s):  AMPUTATION, TOE-5TH - Wound Class: Dirty or Infected    WEIGHT BEARING STATUS: Weight bearing as tolerated    PT CONSULT: No     ANTIBIOTICS: likely needs PO for two weeks    PLAN TO RETURN TO O.R.: No    WOUND CARE PLAN: Incisional dressing - Change POD #2 (Directions: Adaptic over incision, Gauze, Roll Gauze, Ace Wrap)    FOLLOW-UP: Follow up with  Lianet  In 1  weeks    DURABLE MEDICAL EQUIPMENT: Offloading shoe    OTHER:       Burak Russo M.D.

## 2019-06-11 NOTE — PROGRESS NOTES
Reviewed dc instructions with patient. Educated regarding wound care. Patient sent with wound care supplies. Dressing to be changed tomorrow on pod #2. Wife educated as well. Educated regarding dc instructions for medications. All questions answered. IV dc'ed. Reviewed follow up appointments. VSS.

## 2019-06-11 NOTE — ANESTHESIA TIME REPORT
Anesthesia Start and Stop Event Times     Date Time Event    6/10/2019 1752 Anesthesia Start     1829 Anesthesia Stop        Responsible Staff  06/10/19    Name Role Begin End    Macho Child III, M.D. Anesth 1752 1829        Preop Diagnosis (Free Text):  Pre-op Diagnosis     INFECTED LEFT FIFTH TOE        Preop Diagnosis (Codes):  Diagnosis Information     Diagnosis Code(s):         Post op Diagnosis  Osteomyelitis  acutely infected open diabetic 5th toe wound with osteomyelitis    Premium Reason  A. 3PM - 7AM    Comments: Emergency

## 2019-06-11 NOTE — DISCHARGE INSTRUCTIONS
Discharge Instructions    Discharged to home by car with relative. Discharged via wheelchair, hospital escort: Yes.  Special equipment needed: Not Applicable    Be sure to schedule a follow-up appointment with your primary care doctor or any specialists as instructed.     Discharge Plan:   Influenza Vaccine Indication: Not indicated: Previously immunized this influenza season and > 8 years of age    I understand that a diet low in cholesterol, fat, and sodium is recommended for good health. Unless I have been given specific instructions below for another diet, I accept this instruction as my diet prescription.   Other diet: Diabetic    Special Instructions: None    · Is patient discharged on Warfarin / Coumadin?   No     Dressing change instructions:  Incisional dressing - Change POD #2 (Directions: Adaptic over incision, Gauze, Roll Gauze, Ace Wrap)    Follow up with wound clinic in a week    Depression / Suicide Risk    As you are discharged from this [facility] facility, it is important to learn how to keep safe from harming yourself.    Recognize the warning signs:  · Abrupt changes in personality, positive or negative- including increase in energy   · Giving away possessions  · Change in eating patterns- significant weight changes-  positive or negative  · Change in sleeping patterns- unable to sleep or sleeping all the time   · Unwillingness or inability to communicate  · Depression  · Unusual sadness, discouragement and loneliness  · Talk of wanting to die  · Neglect of personal appearance   · Rebelliousness- reckless behavior  · Withdrawal from people/activities they love  · Confusion- inability to concentrate     If you or a loved one observes any of these behaviors or has concerns about self-harm, here's what you can do:  · Talk about it- your feelings and reasons for harming yourself  · Remove any means that you might use to hurt yourself (examples: pills, rope, extension cords, firearm)  · Get  professional help from the community (Mental Health, Substance Abuse, psychological counseling)  · Do not be alone:Call your Safe Contact- someone whom you trust who will be there for you.  · Call your local CRISIS HOTLINE 292-6143 or 214-361-7170  · Call your local Children's Mobile Crisis Response Team [place] (354) 705-3338 or www.SignalSet  · Call the toll free National Suicide Prevention Hotlines   · National Suicide Prevention Lifeline 068-468-UCOM (0705)  · National Hope Line Network 800-SUICIDE (581-6830)

## 2019-06-11 NOTE — CARE PLAN
Problem: Knowledge Deficit  Goal: Knowledge of disease process/condition, treatment plan, diagnostic tests, and medications will improve  Outcome: PROGRESSING AS EXPECTED  Patient verbalizes understanding of condition and current treatment plan  Goal: Knowledge of the prescribed therapeutic regimen will improve  Outcome: PROGRESSING AS EXPECTED  Medications explained prior to administration. Patient verbalizes understanding.

## 2019-06-11 NOTE — OR NURSING
Pt awake and alert. No complaints of nausea, no pain, tolerating fluids.Dressing CDI. VSS, on room air, O2Sat 99%. Wife updated.

## 2019-06-11 NOTE — THERAPY
"Physical Therapy Evaluation completed.   Bed Mobility:  Supine to Sit: Independent  Transfers: Sit to Stand: Supervised  Gait: Level Of Assist: Supervised with No Equipment Needed       Plan of Care: Patient with no further skilled PT needs in the acute care setting at this time  Discharge Recommendations: Equipment: No Equipment Needed. Post-acute therapy Currently anticipate no further skilled therapy needs once patient is discharged from the inpatient setting.    See \"Rehab Therapy-Acute\" Patient Summary Report for complete documentation.     "

## 2019-06-11 NOTE — ANESTHESIA POSTPROCEDURE EVALUATION
Patient: Hansel Burgos    Procedure Summary     Date:  06/10/19 Room / Location:  [place] [address] / SURGERY [facility]    Anesthesia Start:  1752 Anesthesia Stop:  1829    Procedure:  AMPUTATION, TOE-5TH (Left Toe) Diagnosis:  (INFECTED LEFT FIFTH TOE)    Surgeon:  Melo Martini M.D. Responsible Provider:  Macho Child III, M.D.    Anesthesia Type:  general ASA Status:  3 - Emergent          Final Anesthesia Type: general  Last vitals  BP   Blood Pressure: 156/85, NIBP: 136/86    Temp   36.2 °C (97.2 °F)    Pulse   Pulse: 67, Heart Rate (Monitored): 65   Resp   14    SpO2   99 %      Anesthesia Post Evaluation    Patient location during evaluation: PACU  Patient participation: complete - patient participated  Level of consciousness: awake and alert  Pain score: 0    Airway patency: patent  Anesthetic complications: no  Cardiovascular status: hemodynamically stable  Respiratory status: acceptable  Hydration status: euvolemic    PONV: none           Nurse Pain Score: 0 (NPRS)

## 2019-06-11 NOTE — THERAPY
"Occupational Therapy Evaluation completed.   Functional Status: Up w/mod I for bed mobility, sit>stand walking in room and hallway no AD, initially had boot however did not fit contacted traction who provided shoe. Pt has good support of spouse no c/o pain no impairment to ADL participation at this time   Plan of Care: Patient with no further skilled OT needs in the acute care setting at this time  Discharge Recommendations:  Equipment: No Equipment Needed. Post-acute therapy Anticipate that the patient will have no further occupational therapy needs after discharge from the hospital.       See \"Rehab Therapy-Acute\" Patient Summary Report for complete documentation.    54 yr old male admitted for L fifth toe wound is now s/p amputation doing well pt demonstrated ability to complete ADL's w/o assistance; anticipate no further OT needs      "

## 2019-06-11 NOTE — PROGRESS NOTES
Received bedside report and updates from PACU and assumed care at 1935    Pt is A&O x4  Pain 3/10, declines intervention  Denies nausea  Tolerating diabetic diet  + Urine Output  + Flatus  LBM 6/9  ACE bandage to left foot, diabetic boot at bedside  Up with standby assistance   SCD's on  Family at bedside  Bed in lowest position and locked.  Reviewed plan of care with patient, pt resting comfortably now, call light within reach, all needs met at this time

## 2019-06-11 NOTE — CONSULTS
DATE OF SERVICE:  06/10/2019    CONSULTING PHYSICIAN:  Limb preservation service.    REASON FOR CONSULTATION:  Left fifth toe wound.    HISTORY OF PRESENT ILLNESS:  The patient is a 54-year-old male.  He has been   arguing with his wife for sometime.  He has had a chronic ulceration.  He   presented to the [facility] because his toe had become worse.  They   put him on antibiotics and told him direct to go to the hospital here in [place].    He presented here.  He had increasing drainage wound.  Denies any fevers or   chills.  Denies any general malaise.  Overall, things have not gotten any   better.  He was transferred to local wound care and dressing changes, which   was not effective.    PAST MEDICAL HISTORY, MEDICINES, ALLERGIES, FAMILY HISTORY, SOCIAL HISTORY,   AND REVIEW OF SYSTEMS:  Are reviewed on Dr. Cuadra's admit note dated   06/08/2019.    PHYSICAL EXAMINATION:  VITAL SIGNS:  Afebrile, vital signs stable.  GENERAL:  Well-appearing male, in no acute distress.  PSYCHIATRIC:  Pleasant demeanor, normal affect.  EYES:  Pupils equal, round.  RESPIRATIONS:  Regular rate, Unlabored breathing.  CARDIOVASCULAR:  He has palpable dorsalis pedis pulses and posterior tibial   pulse.  Regular rate and rhythm.  Brisk capillary refill.  NEUROLOGIC:  Decreased sensation of his toes.  Muscle strength is   satisfactory.  SKIN:  Shows an ulceration over the lateral aspect of his fifth toe, full   thickness.  Some surrounding cellulitis and some purulent drainage.  MUSCULOSKELETAL:  He has some hammering of his toes.  He has good alignment.    He has good range of motion.  He has no instability.  Muscle strength is   satisfactory.  He is nonambulatory.    IMAGING:  X-rays reviewed, demonstrate some evidence for osteomyelitis.    MRI is reviewed.  ____ evidence of osteomyelitis of his fifth toe on the left.    LABORATORY DATA:  White count of 8.7, glucose 129.    IMPRESSION:  1.  Diabetes.  2.  Neuropathy.  3.  Chronic  wound of his left toe with underlying soft tissue infection and   osteomyelitis.    PLAN:  Discussed with the patient and his wife about options including   operative and nonoperative.  They elected to proceed with operative   intervention.  Given the extent of his osteomyelitis, I have recommended a   left toe amputation with irrigation and debridement.  I discussed the   procedure, postoperative course, risks, and benefits.  We will schedule this   next available.       ____________________________________     MD LONNIE ROTHMAN / BREANNA    DD:  06/10/2019 18:16:54  DT:  06/10/2019 22:14:10    D#:  [id number]  Job#:  [id number]

## 2019-06-11 NOTE — ANESTHESIA QCDR
2019 [facility] Clinical Data Registry (for Quality Improvement)     Postoperative nausea/vomiting risk protocol (Adult = 18 yrs and Pediatric 3-17 yrs)- (430 and 463)  General inhalation anesthetic (NOT TIVA) with PONV risk factors: Yes  Provision of anti-emetic therapy with at least 2 different classes of agents: Yes   Patient DID NOT receive anti-emetic therapy and reason is documented in Medical Record:  N/A    Multimodal Pain Management- (AQI59)  Patient undergoing Elective Surgery (i.e. Outpatient, or ASC, or Prescheduled Surgery prior to Hospital Admission): Yes  Use of Multimodal Pain Management, two or more drugs and/or interventions, NOT including systemic opioids: Yes   Exception: Documented allergy to multiple classes of analgesics:  N/A    PACU assessment of acute postoperative pain prior to Anesthesia Care End- Applies to Patients Age = 18- (ABG7)  Initial PACU pain score is which of the following: < 7/10  Patient unable to report pain score: N/A    Post-anesthetic transfer of care checklist/protocol to PACU/ICU- (426 and 427)  Upon conclusion of case, patient transferred to which of the following locations: PACU/Non-ICU  Use of transfer checklist/protocol: Yes  Exclusion: Service Performed in Patient Hospital Room (and thus did not require transfer): N/A    PACU Reintubation- (AQI31)  General anesthesia requiring endotracheal intubation (ETT) along with subsequent extubation in OR or PACU: No  Required reintubation in the PACU: N/A  Extubation was a planned trial documented in the medical record prior to removal of the original airway device: N/A    Unplanned admission to ICU related to anesthesia service up through end of PACU care- (MD51)  Unplanned admission to ICU (not initially anticipated at anesthesia start time): No

## 2019-06-14 LAB
BACTERIA BLD CULT: NORMAL
BACTERIA BLD CULT: NORMAL
SIGNIFICANT IND 70042: NORMAL
SIGNIFICANT IND 70042: NORMAL
SITE SITE: NORMAL
SITE SITE: NORMAL
SOURCE SOURCE: NORMAL
SOURCE SOURCE: NORMAL

## 2019-06-18 ENCOUNTER — NON-PROVIDER VISIT (OUTPATIENT)
Dept: WOUND CARE | Facility: MEDICAL CENTER | Age: 54
End: 2019-06-18
Attending: INTERNAL MEDICINE
Payer: COMMERCIAL

## 2019-06-18 PROCEDURE — 97602 WOUND(S) CARE NON-SELECTIVE: CPT

## 2019-06-18 NOTE — CERTIFICATION
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 He was treated with IV antibiotics and it was recommended that he be admitted. He refused so he could return home to [place]. The wound got worse, he went to the ER at [facility], was admitted on 6/8/19 and received IV antibiotics and an MRI. He had a Lt 5th toe amputation with Dr. Martini on 6/10/19. Pt states that Dr. Martini said the sutures should come out three weeks after the surgery.    PERTINENT PMH: DM 2, polyneuropathy, HTN, osteomyelitis    IMAGING:MRI left foot 6/9/2019      VASCULAR STUDIES:none in EPIC     LAST  WOUND CULTURE DATE : 6/9/2019 Staph aureus, light growth             OFFLOADING: post op shoe    Most recent A1c:  6/8/2019 7.2  Pt states that his blood sugars are running \"about 105, and I've lost 40 pounds in the last six months.\"     TOBACCO USE: no      RESULTS: MRI left foot 6/9/2019 Impression       1.  Marrow edema and cartilage loss with subchondral cystic change and osteophytic spurring involving the first, second and third MTP joints. There is also surrounding soft tissue prominence likely representing cellulitis with minimal joint fluid.   Considering the presence of arthropathy, this is unlikely to represent osteomyelitis and most likely represents presence of osteoarthritis versus inflammatory or crystal-induced arthropathy.    2.  Marrow edema involving the fifth proximal, middle and distal phalanges with surrounding soft tissue swelling. There is relative sparing of the fifth metatarsal head. Considering the absence of significant arthropathy involving this joint, these   findings are worrisome for osteomyelitis.    3.  Soft " tissue swelling involving the dorsum of the foot likely representing cellulitis.    4.  Prominent osteoarthritis of the tarsal metatarsal joints and the articulations of the midfoot.         CLINIC ANN: Pt had easily palpable pulses left DP/PT 2+, with Doppler pulses biphasic and brisk to DP/PT.    FALL RISK ASSESSMENT- mild fall risk   65 years or older     Fall within the last 2 years   Uses ambulatory devices  Loss of protective sensation in feet x  Use of prostethic/orthotic    Presence of lower extremity/foot/toe amputationx   Taking medication that increases risk (per facility policy)    Interventions Recommended (if any of the above are selected):   Use of Assistive Device:   Supervision with ambulation: Caregiver   Assistance with ambulation: Caregiver   Home safety education: Educational material provided    PAST MEDICAL HISTORY:   Past Medical History:   Diagnosis Date   • Arthritis     Kenn feet   • Diabetes (HCC)    • High cholesterol    • Hyperlipidemia    • Hypertension    • Pain     knee        PAST SURGICAL HISTORY:   Past Surgical History:   Procedure Laterality Date   • TOE AMPUTATION Left 6/10/2019    Procedure: AMPUTATION, TOE-5TH;  Surgeon: Melo Martini M.D.;  Location: [facility];  Service: Orthopedics   • BUNIONECTOMY Left 5/1/2018    Procedure: BUNIONECTOMY - VILLAGOMEZ;  Surgeon: Jono Shipman D.P.M.;  Location: [facility];  Service: Podiatry   • KNEE ARTHROSCOPY Left 10/24/2016    Procedure: KNEE ARTHROSCOPY;  Surgeon: Sinan Truong M.D.;  Location: [facility];  Service:    • MEDIAL MENISCECTOMY Left 10/24/2016    Procedure: MEDIAL MENISCECTOMY - PARTIAL; DEBRIDEMENT; CHONDROPLASTY;  Surgeon: iSnan Truong M.D.;  Location: [facility];  Service:    • OTHER ORTHOPEDIC SURGERY Right 2000    tendon repair        MEDICATIONS:   Current Outpatient Prescriptions   Medication   • sulfamethoxazole-trimethoprim (BACTRIM DS) 800-160 MG  tablet   • Semaglutide (OZEMPIC) 0.25 or 0.5 MG/DOSE Solution Pen-injector   • metformin (GLUCOPHAGE) 1000 MG tablet   • atorvastatin (LIPITOR) 40 MG Tab   • Empagliflozin (JARDIANCE) 10 MG Tab   • lisinopril (PRINIVIL) 10 MG Tab   • ibuprofen (MOTRIN) 200 MG Tab   • aspirin (ASA) 325 MG Tab     No current facility-administered medications for this visit.        ALLERGIES:    Allergies   Allergen Reactions   • Pcn [Penicillins]      Had as a child         SOCIAL HISTORY:   Social History     Social History   • Marital status:      Spouse name: N/A   • Number of children: N/A   • Years of education: N/A     Social History Main Topics   • Smoking status: Never Smoker   • Smokeless tobacco: Never Used   • Alcohol use 0.0 oz/week      Comment: Rarely   • Drug use: No   • Sexual activity: Not on file     Other Topics Concern   • Not on file     Social History Narrative   • No narrative on file       FAMILY HISTORY:   Family History   Problem Relation Age of Onset   • Cancer Father         pancreatic   • Alcohol/Drug Father            WOUND ASSESSMENT-     Pre-debridement photo           Procedures:     Debridement :  Non selective with NS, gauze   Cleansed with:       NS, gauze                                                                   Periwound protected with:Skin Prep   Primary dressing:Mepitel, Aquacel AG   Secondary Dressing:foam secured with tape   Other: Tubigrip E        Post-debridement photo - no debridement done - some eschar to incision site but it is underneath the sutures.       PATIENT EDUCATION  - Importance of tight glucose control for wound healing   - Implications of loss of protective sensation (LOPS) discussed with patient- including increased risk for amputation.  - Advised to check feet at least daily, moisturize feet, and to always wear protective foot wear.   -  Importance of offloading foot to assist with wound healing  - Advised pt not to trim nails or calluses, seek foot/nail care  from podiatrist or certified foot/nail nurse  - Importance of adequate nutrition for wound healing  - Increase protein intake (unless contraindicated by renal status)  - recommended Arginaid for improved wound healing

## 2019-06-19 NOTE — PROCEDURES
Non selective with NS, gauze.   Pt states Dr. Martini said sutures to come out three weeks post-surgery.

## 2019-06-26 ENCOUNTER — NON-PROVIDER VISIT (OUTPATIENT)
Dept: WOUND CARE | Facility: MEDICAL CENTER | Age: 54
End: 2019-06-26
Attending: INTERNAL MEDICINE
Payer: COMMERCIAL

## 2019-06-26 PROCEDURE — 99211 OFF/OP EST MAY X REQ PHY/QHP: CPT

## 2019-06-26 NOTE — PATIENT INSTRUCTIONS
Should you experience any significant changes in your wound(s) such as infection (redness, swelling, localized heat, increased pain, fever >101 F, chills) or have any questions regarding your home care instructions, please contact the wound center [phone]. If after hours, contact your primary care physician or go the hospital emergency room.  Keep dressing clean and dry and cover while bathing. Only change dressing if over saturated, soiled or its falling off.

## 2019-07-02 ENCOUNTER — OFFICE VISIT (OUTPATIENT)
Dept: WOUND CARE | Facility: MEDICAL CENTER | Age: 54
End: 2019-07-02
Attending: INTERNAL MEDICINE
Payer: COMMERCIAL

## 2019-07-02 VITALS
HEART RATE: 75 BPM | TEMPERATURE: 97.3 F | RESPIRATION RATE: 18 BRPM | SYSTOLIC BLOOD PRESSURE: 116 MMHG | OXYGEN SATURATION: 100 % | DIASTOLIC BLOOD PRESSURE: 77 MMHG

## 2019-07-02 DIAGNOSIS — S98.132A AMPUTATED TOE OF LEFT FOOT (HCC): Primary | ICD-10-CM

## 2019-07-02 DIAGNOSIS — E11.42 DIABETIC POLYNEUROPATHY ASSOCIATED WITH TYPE 2 DIABETES MELLITUS (HCC): ICD-10-CM

## 2019-07-02 PROCEDURE — 99212 OFFICE O/P EST SF 10 MIN: CPT

## 2019-07-02 PROCEDURE — 99213 OFFICE O/P EST LOW 20 MIN: CPT | Performed by: NURSE PRACTITIONER

## 2019-07-02 ASSESSMENT — ENCOUNTER SYMPTOMS
DIARRHEA: 0
NAUSEA: 0
CONSTIPATION: 0
SHORTNESS OF BREATH: 0
COUGH: 0
FEVER: 0
CLAUDICATION: 0
CHILLS: 0

## 2019-07-02 NOTE — PROGRESS NOTES
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 He was treated with IV antibiotics and it was recommended that he be admitted. He refused so he could return home to [place]. The wound got worse, he went to the ER at [facility], was admitted on 6/8/19 and received IV antibiotics and an MRI. He had a Lt 5th toe amputation with Dr. Martini on 6/10/19. Pt states that Dr. Martini said the sutures should come out three weeks after the surgery.               PERTINENT PMH: DM 2, polyneuropathy, HTN, osteomyelitis             IMAGING:MRI left foot 6/9/2019                  VASCULAR STUDIES:none in EPIC                           LAST  WOUND CULTURE DATE : 6/9/2019 Staph aureus, light growth                         OFFLOADING: post op shoe      DIABETES HX: Diagnosed with type 2 diabetes approximately 10 years ago, and is currently managing with oral medications and Ozempic.  Checks blood sugars 1-2 times per day and reports that these typically run around 1 20-1 50.  Has  had previous diabetes education.  He has numbness in both feet.  He has had orthotic shoes and inserts in the past, but not for the past 2 to 3 years.  He does  check his feet routinely.  He has  had previous surgery to both feet.  Most recently left fifth toe amputation due to infection after traumatic injury.  Most recent A1c:  6/8/2019 -  7.2%  Pt states that his blood sugars are running \"about 105, and I've lost 40 pounds in the last six months.\"      TOBACCO USE: no      7/2: Initial provider visit.  Patient's toe amp site is well-healed.  Sutures removed by RN.  Rx for orthotic shoes and inserts " provided for patient.  Diabetic foot care education provided.  Patient discharged from [facility].  Advised to continue wearing his offloading boot until his shoes are ready.          RESULTS:       MRI left foot 6/9/2019 Impression        1.  Marrow edema and cartilage loss with subchondral cystic change and osteophytic spurring involving the first, second and third MTP joints. There is also surrounding soft tissue prominence likely representing cellulitis with minimal joint fluid.   Considering the presence of arthropathy, this is unlikely to represent osteomyelitis and most likely represents presence of osteoarthritis versus inflammatory or crystal-induced arthropathy.    2.  Marrow edema involving the fifth proximal, middle and distal phalanges with surrounding soft tissue swelling. There is relative sparing of the fifth metatarsal head. Considering the absence of significant arthropathy involving this joint, these   findings are worrisome for osteomyelitis.    3.  Soft tissue swelling involving the dorsum of the foot likely representing cellulitis.    4.  Prominent osteoarthritis of the tarsal metatarsal joints and the articulations of the midfoot.            CLINIC ANN: Pt had easily palpable pulses left DP/PT 2+, with Doppler pulses biphasic and brisk to DP/PT.            PAST MEDICAL HISTORY:   Past Medical History:   Diagnosis Date   • Arthritis     Kenn feet   • Diabetes (HCC)    • High cholesterol    • Hyperlipidemia    • Hypertension    • Pain     knee        PAST SURGICAL HISTORY:   Past Surgical History:   Procedure Laterality Date   • TOE AMPUTATION Left 6/10/2019    Procedure: AMPUTATION, TOE-5TH;  Surgeon: Melo Martini M.D.;  Location: SURGERY [facility];  Service: Orthopedics   • BUNIONECTOMY Left 5/1/2018    Procedure: BUNIONECTOMY - HERNANDO;  Surgeon: Jono Shipman D.P.M.;  Location: SURGERY [facility];  Service: Podiatry   • KNEE ARTHROSCOPY Left 10/24/2016    Procedure: KNEE  ARTHROSCOPY;  Surgeon: Sinan Truong M.D.;  Location: SURGERY [facility];  Service:    • MEDIAL MENISCECTOMY Left 10/24/2016    Procedure: MEDIAL MENISCECTOMY - PARTIAL; DEBRIDEMENT; CHONDROPLASTY;  Surgeon: Sinan Truong M.D.;  Location: SURGERY [facility];  Service:    • OTHER ORTHOPEDIC SURGERY Right 2000    tendon repair        MEDICATIONS:   Current Outpatient Prescriptions   Medication   • Semaglutide (OZEMPIC) 0.25 or 0.5 MG/DOSE Solution Pen-injector   • metformin (GLUCOPHAGE) 1000 MG tablet   • atorvastatin (LIPITOR) 40 MG Tab   • Empagliflozin (JARDIANCE) 10 MG Tab   • lisinopril (PRINIVIL) 10 MG Tab   • ibuprofen (MOTRIN) 200 MG Tab   • aspirin (ASA) 325 MG Tab     No current facility-administered medications for this visit.        ALLERGIES:    Allergies   Allergen Reactions   • Pcn [Penicillins]      Had as a child         SOCIAL HISTORY:   Social History     Social History   • Marital status:      Spouse name: N/A   • Number of children: N/A   • Years of education: N/A     Social History Main Topics   • Smoking status: Never Smoker   • Smokeless tobacco: Never Used   • Alcohol use 0.0 oz/week      Comment: Rarely   • Drug use: No   • Sexual activity: Not on file     Other Topics Concern   • Not on file     Social History Narrative   • No narrative on file       FAMILY HISTORY:   Family History   Problem Relation Age of Onset   • Cancer Father         pancreatic   • Alcohol/Drug Father         REVIEW OF SYSTEMS:   Review of Systems   Constitutional: Negative for chills and fever.   Respiratory: Negative for cough and shortness of breath.    Cardiovascular: Negative for chest pain, claudication and leg swelling.   Gastrointestinal: Negative for constipation, diarrhea and nausea.   Genitourinary: Negative for dysuria.   Musculoskeletal: Negative for joint pain.   Neurological:        Numbness in both feet       PHYSICAL EXAMINATION:   /77   Pulse 75   Temp 36.3 °C (97.3 °F)  (Temporal)   Resp 18   SpO2 100%     Physical Exam   Constitutional: He is oriented to person, place, and time and well-developed, well-nourished, and in no distress.   HENT:   Head: Normocephalic.   Eyes: Pupils are equal, round, and reactive to light.   Cardiovascular: Intact distal pulses.    Pulmonary/Chest: Effort normal.   Musculoskeletal: Normal range of motion.   Recent left fifth toe amputation   Neurological: He is alert and oriented to person, place, and time.   Both feet insensate to light touch   Skin: Skin is warm and dry.   Surgical site, left fifth toe amputation, well approximated  Sutures removed in clinic today   Psychiatric: Mood, memory, affect and judgment normal.       WOUND ASSESSMENT-no open wound    Left fifth toe amputation site-pre-suture removal                PROCEDURE: .  - sutures removed by Erica Bain RN         Left fifth toe amputation site post suture removal              PATIENT EDUCATION  - Importance of tight glucose control for overall general health, and ulcer prevention discussed  - Implications of loss of protective sensation (LOPS) discussed with patient- including increased risk for amputation.  - Advised to check feet at least daily, moisturize feet, and to always wear protective foot wear.   -Advised him to never go barefoot, or to wear flip-flops or sandals.  - Advised pt not to trim nails or calluses, seek foot/nail care from podiatrist or certified foot/nail nurse  -Advised him to seek medical treatment immediately for any wounds to his feet.            ASSESSMENT AND PLAN:     1. Amputated toe of left foot (HCC)  Comments: Patient cut his toe while working, wound became infected, toe amputated on 6/10/2019    7/2: Initial provider assessment  -Incision well-healed  -Sutures removed in clinic  -Rx for diabetic shoes and inserts provided.  Patient advised to get these as soon as possible.  He is to continue to wear his offloading boot until she is  ready.  -Patient discharged from [facility].  He understands he is return to clinic if the surgical site dehiscence, or if he develops new wounds.    2. Diabetes mellitus type 2, uncontrolled, with complications (HCC)  Comments: Most recent A1c 7.2%  7/2:  -Patient to continue to work with his PCP to lower his A1c  -Adverse effects of hyperglycemia on vascular nervous system, as well as on general health discussed    3. Diabetic polyneuropathy associated with type 2 diabetes mellitus (HCC)  Comments: Both feet insensate to light touch    7/2:  Implications of loss of protective sensation (LOPS) discussed with patient- including increased risk for amputation.  Advised to check feet at least daily, moisturize feet, and to always wear protective foot wear.           Please note that this dictation was created using voice recognition software. I have worked with technical experts from [employer] to optimize the interface.  I have made every reasonable attempt to correct obvious errors, but there may be errors of grammar and possibly content that I did not discover before finalizing the note.

## 2019-07-02 NOTE — PATIENT INSTRUCTIONS
-Please obtain a new referral if you need to return to [facility].    -Should you experience any significant signs of infection (redness, swelling, localized heat, increased pain, fever > 101 F, chills) or have any questions regarding your home care instructions, please contact the wound center at [phone]. If after hours, contact your primary care physician or go to the hospital emergency room.

## 2019-07-10 ENCOUNTER — APPOINTMENT (OUTPATIENT)
Dept: WOUND CARE | Facility: MEDICAL CENTER | Age: 54
End: 2019-07-10
Attending: INTERNAL MEDICINE
Payer: COMMERCIAL

## 2019-07-17 ENCOUNTER — APPOINTMENT (OUTPATIENT)
Dept: WOUND CARE | Facility: MEDICAL CENTER | Age: 54
End: 2019-07-17
Attending: INTERNAL MEDICINE
Payer: COMMERCIAL

## 2019-09-29 RX ORDER — EMPAGLIFLOZIN 10 MG/1
TABLET, FILM COATED ORAL
Qty: 30 TAB | Refills: 5 | Status: SHIPPED | OUTPATIENT
Start: 2019-09-29

## 2019-10-14 DIAGNOSIS — E78.5 DYSLIPIDEMIA ASSOCIATED WITH TYPE 2 DIABETES MELLITUS (HCC): ICD-10-CM

## 2019-10-14 DIAGNOSIS — E11.69 DYSLIPIDEMIA ASSOCIATED WITH TYPE 2 DIABETES MELLITUS (HCC): ICD-10-CM

## 2019-10-14 DIAGNOSIS — I10 ESSENTIAL HYPERTENSION: ICD-10-CM

## 2019-10-14 RX ORDER — EMPAGLIFLOZIN 10 MG/1
TABLET, FILM COATED ORAL
Qty: 90 TAB | Refills: 1 | Status: SHIPPED | OUTPATIENT
Start: 2019-10-14

## 2019-10-14 RX ORDER — ATORVASTATIN CALCIUM 40 MG/1
TABLET, FILM COATED ORAL
Qty: 90 TAB | Refills: 1 | Status: SHIPPED | OUTPATIENT
Start: 2019-10-14

## 2019-10-14 RX ORDER — LISINOPRIL 10 MG/1
TABLET ORAL
Qty: 90 TAB | Refills: 1 | Status: SHIPPED | OUTPATIENT
Start: 2019-10-14

## 2019-10-14 RX ORDER — SEMAGLUTIDE 1.34 MG/ML
INJECTION, SOLUTION SUBCUTANEOUS
Qty: 12 PEN | Refills: 1 | Status: SHIPPED | OUTPATIENT
Start: 2019-10-14

## 2019-12-07 ENCOUNTER — PATIENT MESSAGE (OUTPATIENT)
Dept: MEDICAL GROUP | Facility: MEDICAL CENTER | Age: 54
End: 2019-12-07

## 2022-12-02 ENCOUNTER — TELEPHONE (OUTPATIENT)
Dept: MEDICAL GROUP | Facility: MEDICAL CENTER | Age: 57
End: 2022-12-02
